# Patient Record
Sex: FEMALE | Race: WHITE | Employment: FULL TIME | URBAN - METROPOLITAN AREA
[De-identification: names, ages, dates, MRNs, and addresses within clinical notes are randomized per-mention and may not be internally consistent; named-entity substitution may affect disease eponyms.]

---

## 2017-02-17 ENCOUNTER — PATIENT MESSAGE (OUTPATIENT)
Dept: FAMILY MEDICINE CLINIC | Facility: CLINIC | Age: 52
End: 2017-02-17

## 2017-02-17 NOTE — TELEPHONE ENCOUNTER
From: Jaguar Rasmussen  To: Pura Hamilton MD  Sent: 2/17/2017 6:41 AM CST  Subject: Other    Hi Dr. Dominique Jones the new year is going well for you and your family.      Could you please fax a copy of all the shots I have had that you have on record

## 2017-03-16 ENCOUNTER — PATIENT MESSAGE (OUTPATIENT)
Dept: FAMILY MEDICINE CLINIC | Facility: CLINIC | Age: 52
End: 2017-03-16

## 2017-03-17 NOTE — TELEPHONE ENCOUNTER
From: Ashley Kelly  To: Shelly Quarles MD  Sent: 3/16/2017 5:55 PM CDT  Subject: Other    Hi dr Karthikeyan Avila! 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 I know you tested me for it.  Could you please send me the test result and I buzz

## 2017-06-06 DIAGNOSIS — E03.9 HYPOTHYROIDISM, UNSPECIFIED TYPE: Primary | ICD-10-CM

## 2017-06-07 ENCOUNTER — PATIENT MESSAGE (OUTPATIENT)
Dept: FAMILY MEDICINE CLINIC | Facility: CLINIC | Age: 52
End: 2017-06-07

## 2017-06-07 DIAGNOSIS — E03.9 HYPOTHYROIDISM, UNSPECIFIED TYPE: Primary | ICD-10-CM

## 2017-06-07 DIAGNOSIS — E55.9 VITAMIN D DEFICIENCY: ICD-10-CM

## 2017-06-07 DIAGNOSIS — D50.8 OTHER IRON DEFICIENCY ANEMIA: ICD-10-CM

## 2017-06-07 RX ORDER — LEVOTHYROXINE SODIUM 0.12 MG/1
125 TABLET ORAL
Qty: 90 TABLET | Refills: 0 | Status: SHIPPED | OUTPATIENT
Start: 2017-06-07 | End: 2017-09-07

## 2017-06-07 NOTE — TELEPHONE ENCOUNTER
From: Ozzy Sutherland  To: Rick Shah MD  Sent: 6/7/2017 7:26 AM CDT  Subject: Other    Hi Dr. Cheo Jacobs,    I think last October or so I had some blood drawn but did not do one of the tests because you had to fast and I did not know that.  I would li

## 2017-06-07 NOTE — TELEPHONE ENCOUNTER
No future appointments. LOV 10/16    LAST LAB 1/16    LAST RX  Levothyroxine Sodium 125 MCG Oral Tab (Discontinued) 30 tablet 1 10/12/2016       PROTOCOL  Thyroid Supplements Protocol Passed6/6 8:    Refilled x 3 months.

## 2017-06-27 ENCOUNTER — PATIENT MESSAGE (OUTPATIENT)
Dept: FAMILY MEDICINE CLINIC | Facility: CLINIC | Age: 52
End: 2017-06-27

## 2017-06-27 NOTE — TELEPHONE ENCOUNTER
From: Brandie Brian  To: Melvin Hein MD  Sent: 6/27/2017 12:35 PM CDT  Subject: Test Results Question    Hi Dr. Urbano Castorena,    I had some blood drawn last week and have not heard or seen results?

## 2017-09-06 ENCOUNTER — TELEPHONE (OUTPATIENT)
Dept: FAMILY MEDICINE CLINIC | Facility: CLINIC | Age: 52
End: 2017-09-06

## 2017-09-06 ENCOUNTER — PATIENT MESSAGE (OUTPATIENT)
Dept: FAMILY MEDICINE CLINIC | Facility: CLINIC | Age: 52
End: 2017-09-06

## 2017-09-06 DIAGNOSIS — E03.9 HYPOTHYROIDISM, UNSPECIFIED TYPE: ICD-10-CM

## 2017-09-06 RX ORDER — LEVOTHYROXINE SODIUM 112 MCG
112 TABLET ORAL
Refills: 0 | Status: CANCELLED | OUTPATIENT
Start: 2017-09-06

## 2017-09-06 NOTE — TELEPHONE ENCOUNTER
No future appointments.     LOV 10/16    LAST LAB 6/17    LAST RX  Levothyroxine Sodium 125 MCG Oral Tab (Discontinued) 90 tablet 1 11/30/2016 6/6/2017   Sig :  Take 1 tablet (125 mcg total) by mouth before breakfast.           PROTOCOL  Thyroid Supplements

## 2017-09-07 RX ORDER — LEVOTHYROXINE SODIUM 0.12 MG/1
125 TABLET ORAL
Qty: 30 TABLET | Refills: 0 | Status: SHIPPED | OUTPATIENT
Start: 2017-09-07 | End: 2017-09-26 | Stop reason: ALTCHOICE

## 2017-09-07 NOTE — TELEPHONE ENCOUNTER
Pt called and stated she wants to get her med filled for more than 1 month as her labs were just done 2 months ago. I offered a few times to schedule an appt and pt declined.     Said she's never had so much trouble getting medication filled and asked to

## 2017-09-07 NOTE — TELEPHONE ENCOUNTER
Spoke with pt, appt made    Future Appointments  Date Time Provider Ji Cordon   9/26/2017 1:00 PM Lester Del Rio MD [facility] [address]

## 2017-09-07 NOTE — TELEPHONE ENCOUNTER
Helena Postal, LPN   to Madyson Cabral           11:29 AM   Bola Hunter, you did repeat your thyroid labs in June but you haven't been into see Dr Lenny De Leon since 1/03/2016   You need to be seen yearly for this type of medication     Please give us a call or ch

## 2017-09-07 NOTE — TELEPHONE ENCOUNTER
From: Judene Runner, LPN  To: Darby Solano  Sent: 9/6/2017 4:04 PM CDT  Subject: information    Good afternoon Ann-Marie Varma    No future appointments. An appointment is due in order to get another refill. Please call [phone] for an appointment.      I ga

## 2017-09-26 ENCOUNTER — OFFICE VISIT (OUTPATIENT)
Dept: FAMILY MEDICINE CLINIC | Facility: CLINIC | Age: 52
End: 2017-09-26

## 2017-09-26 VITALS
RESPIRATION RATE: 16 BRPM | HEART RATE: 66 BPM | TEMPERATURE: 98 F | WEIGHT: 131.13 LBS | BODY MASS INDEX: 21.85 KG/M2 | DIASTOLIC BLOOD PRESSURE: 64 MMHG | SYSTOLIC BLOOD PRESSURE: 116 MMHG | HEIGHT: 65 IN

## 2017-09-26 DIAGNOSIS — N95.1 PERIMENOPAUSAL: ICD-10-CM

## 2017-09-26 DIAGNOSIS — H60.501 ACUTE OTITIS EXTERNA OF RIGHT EAR, UNSPECIFIED TYPE: ICD-10-CM

## 2017-09-26 DIAGNOSIS — Z00.00 ROUTINE GENERAL MEDICAL EXAMINATION AT A HEALTH CARE FACILITY: Primary | ICD-10-CM

## 2017-09-26 DIAGNOSIS — Z23 NEED FOR VACCINATION: ICD-10-CM

## 2017-09-26 DIAGNOSIS — Z12.11 SCREENING FOR COLON CANCER: ICD-10-CM

## 2017-09-26 DIAGNOSIS — E03.9 HYPOTHYROIDISM, UNSPECIFIED TYPE: ICD-10-CM

## 2017-09-26 PROCEDURE — 90471 IMMUNIZATION ADMIN: CPT | Performed by: FAMILY MEDICINE

## 2017-09-26 PROCEDURE — 90686 IIV4 VACC NO PRSV 0.5 ML IM: CPT | Performed by: FAMILY MEDICINE

## 2017-09-26 PROCEDURE — 99213 OFFICE O/P EST LOW 20 MIN: CPT | Performed by: FAMILY MEDICINE

## 2017-09-26 PROCEDURE — 99396 PREV VISIT EST AGE 40-64: CPT | Performed by: FAMILY MEDICINE

## 2017-09-26 RX ORDER — LEVOTHYROXINE SODIUM 125 UG/1
125 TABLET ORAL
Qty: 90 TABLET | Refills: 3 | Status: SHIPPED | OUTPATIENT
Start: 2017-09-26 | End: 2017-10-03 | Stop reason: DRUGHIGH

## 2017-09-26 RX ORDER — NEOMYCIN SULFATE, POLYMYXIN B SULFATE, HYDROCORTISONE 3.5; 10000; 1 MG/ML; [USP'U]/ML; MG/ML
SOLUTION/ DROPS AURICULAR (OTIC) 2 TIMES DAILY
Qty: 10 ML | Refills: 0 | Status: SHIPPED | OUTPATIENT
Start: 2017-09-26 | End: 2017-10-06

## 2017-09-26 NOTE — PATIENT INSTRUCTIONS
Prevention Guidelines, Women Ages 48 to 59  Screening tests and vaccines are an important part of managing your health. Health counseling is essential, too. Below are guidelines for these, for women ages 48 to 59.  Talk with your healthcare provider to ma Lung cancer Adults age 54 to [de-identified] who have smoked Yearly screening in smokers with 30 pack-year history of smoking or who quit within 15 years   Obesity All women in this age group At routine exams   Osteoporosis Women who are postmenopausal Ask your healthc PPSV23: 1 to 2 doses through age 59, or 1 dose at 72 or older (protects against 23 types of pneumococcal bacteria)   Tetanus/diphtheria/pertussis (Td/Tdap) booster All women in this age group Td every 10 years, or a one-time dose of Tdap instead of a Td michelle

## 2017-09-26 NOTE — PROGRESS NOTES
Romina Cline is a 47year old female here for Patient presents with: Well Adult:  Annual cpx  Medication Follow-Up: Thyroid medication refill    HPI:   Patient is seen for annual physical   Patient sees Rika Oconnell for pap and mammogram    Hypothyroidism: earache or change in hearing. No nasal congestion, allergies, sinus pain, nosebleed or sore throat. Heme/lymph: No unusual bleeding or bruising, no lymph node enlargement or tenderness.   Endocrine: No thyroid symptoms (hair, nails, weight, GI, concentrat normal limits, no joint tenderness or synovitis  EXTREMITIES: no cyanosis, clubbing or edema.  Peripheral pulses normal. + varicose veins bilateral  NEURO: Nonfocal exam. Oriented times three,cranial nerves are intact,motor and sensory are grossly intact, s

## 2017-09-30 LAB
ABSOLUTE BASOPHILS: 29 CELLS/UL (ref 0–200)
ABSOLUTE EOSINOPHILS: 51 CELLS/UL (ref 15–500)
ABSOLUTE LYMPHOCYTES: 1203 CELLS/UL (ref 850–3900)
ABSOLUTE MONOCYTES: 239 CELLS/UL (ref 200–950)
ABSOLUTE NEUTROPHILS: 4178 CELLS/UL (ref 1500–7800)
ALBUMIN/GLOBULIN RATIO: 1.7 (CALC) (ref 1–2.5)
ALBUMIN: 3.9 G/DL (ref 3.6–5.1)
ALKALINE PHOSPHATASE: 54 U/L (ref 33–130)
ALT: 11 U/L (ref 6–29)
AST: 16 U/L (ref 10–35)
BASOPHILS: 0.5 %
BILIRUBIN, TOTAL: 0.6 MG/DL (ref 0.2–1.2)
BUN: 9 MG/DL (ref 7–25)
CALCIUM: 8.9 MG/DL (ref 8.6–10.4)
CARBON DIOXIDE: 25 MMOL/L (ref 20–31)
CHLORIDE: 105 MMOL/L (ref 98–110)
CREATININE: 0.81 MG/DL (ref 0.5–1.05)
EGFR IF AFRICN AM: 97 ML/MIN/1.73M2
EGFR IF NONAFRICN AM: 84 ML/MIN/1.73M2
EOSINOPHILS: 0.9 %
GLOBULIN: 2.3 G/DL (CALC) (ref 1.9–3.7)
GLUCOSE: 79 MG/DL (ref 65–99)
HEMATOCRIT: 36.5 % (ref 35–45)
HEMOGLOBIN: 12.6 G/DL (ref 11.7–15.5)
LYMPHOCYTES: 21.1 %
MCH: 32.4 PG (ref 27–33)
MCHC: 34.5 G/DL (ref 32–36)
MCV: 93.8 FL (ref 80–100)
MONOCYTES: 4.2 %
MPV: 10 FL (ref 7.5–12.5)
NEUTROPHILS: 73.3 %
PLATELET COUNT: 229 THOUSAND/UL (ref 140–400)
POTASSIUM: 4.2 MMOL/L (ref 3.5–5.3)
PROTEIN, TOTAL: 6.2 G/DL (ref 6.1–8.1)
RDW: 11.8 % (ref 11–15)
RED BLOOD CELL COUNT: 3.89 MILLION/UL (ref 3.8–5.1)
SODIUM: 137 MMOL/L (ref 135–146)
TSH: 0.25 MIU/L
VITAMIN D, 25-OH, TOTAL: 24 NG/ML (ref 30–100)
WHITE BLOOD CELL COUNT: 5.7 THOUSAND/UL (ref 3.8–10.8)

## 2017-10-03 DIAGNOSIS — E03.9 HYPOTHYROIDISM, UNSPECIFIED TYPE: Primary | ICD-10-CM

## 2017-10-03 DIAGNOSIS — E03.9 HYPOTHYROIDISM, UNSPECIFIED TYPE: ICD-10-CM

## 2017-10-03 RX ORDER — LEVOTHYROXINE SODIUM 112 MCG
112 TABLET ORAL
Qty: 30 TABLET | Refills: 1 | Status: SHIPPED | OUTPATIENT
Start: 2017-10-03 | End: 2017-11-28 | Stop reason: DRUGHIGH

## 2017-10-03 NOTE — TELEPHONE ENCOUNTER
From: Sorin Phillips  Sent: 10/3/2017 1:15 PM CDT  Subject: Medication Renewal Request    Sorin Phillips would like a refill of the following medications:     SYNTHROID 112 MCG Oral Tab [OPBDJENISE Shook MD]   Patient Comment: Hi dr shook-could you pl

## 2017-10-04 RX ORDER — LEVOTHYROXINE SODIUM 112 MCG
112 TABLET ORAL
Qty: 30 TABLET | Refills: 1
Start: 2017-10-04 | End: 2017-11-03

## 2017-10-04 NOTE — TELEPHONE ENCOUNTER
Patient Result Comments     Viewed by Ashlee Abreu on 10/3/2017  1:03 PM   Written by Kyra Monge MD on 10/3/2017 12:34 PM   Thyroid function is too well controlled on the current dose of levothyroxine, will decrease dose to 112 mcg.  Vitamin D ins

## 2017-10-12 ENCOUNTER — TELEPHONE (OUTPATIENT)
Dept: FAMILY MEDICINE CLINIC | Facility: CLINIC | Age: 52
End: 2017-10-12

## 2017-10-12 NOTE — TELEPHONE ENCOUNTER
Daughter dropped off form when she was here today 10-12 for an Appt. Mom asked if Dr Gianni Trujillo could fill out form. Mom was here for Physical 9-26-17. Form on Dr RODRIGUEZ's desk. Will a form fee apply? Please call pt when form is ready.

## 2017-10-13 ENCOUNTER — MED REC SCAN ONLY (OUTPATIENT)
Dept: FAMILY MEDICINE CLINIC | Facility: CLINIC | Age: 52
End: 2017-10-13

## 2017-10-13 NOTE — TELEPHONE ENCOUNTER
Left message for pt, Form is from [employer] like pt may need PPD    Asked pt to call back with additional info to see if Dr Catie Cam can complete?

## 2017-10-13 NOTE — TELEPHONE ENCOUNTER
Pt called and asked to speak w/Yuly. Roro Marrero was standing at the [employer] machine and asked me to ask pt if she needs TB test.  Pt stated No.    Explained to pt Yuly/Dr RODRIGUEZ will complete form and it is ready this afternoon 10-13 to pick-up.     Pt stated

## 2018-01-22 ENCOUNTER — PATIENT MESSAGE (OUTPATIENT)
Dept: FAMILY MEDICINE CLINIC | Facility: CLINIC | Age: 53
End: 2018-01-22

## 2018-01-22 NOTE — TELEPHONE ENCOUNTER
From: Kira Andrews  To: Abdiel Rosales MD  Sent: 1/22/2018 7:49 AM CST  Subject: Prescription Question    Hi Dr. Love Castleman new year! I am almost out of synthroid refill. Know my thyroid level is changing again.  Do I need to get blood briana

## 2018-04-18 ENCOUNTER — PATIENT MESSAGE (OUTPATIENT)
Dept: FAMILY MEDICINE CLINIC | Facility: CLINIC | Age: 53
End: 2018-04-18

## 2018-04-18 NOTE — TELEPHONE ENCOUNTER
From: Brandie Brian  To: Melvin Hein MD  Sent: 4/18/2018 7:17 AM CDT  Subject: Non-Urgent Medical Question    Hi Dr. Urbano Castorena,     I have been running on treadmill for over 20 years. Just couple miles every other day.  For the past 3 weeks I have no

## 2018-04-19 ENCOUNTER — PATIENT MESSAGE (OUTPATIENT)
Dept: FAMILY MEDICINE CLINIC | Facility: CLINIC | Age: 53
End: 2018-04-19

## 2018-04-19 NOTE — TELEPHONE ENCOUNTER
From: Harry Spann  To: Lemuel Lema MD  Sent: 4/19/2018 6:43 AM CDT  Subject: Other    Hi Matagami,     I understand what you are saying but I will almost guarantee Dr. Fox Villeda will request blood work. (THYROID, IRON AND CALCIUM).  And whatever else

## 2018-04-19 NOTE — TELEPHONE ENCOUNTER
Her answer is from this [employer]. Where I advised her to come in. I have been running on treadmill for over 20 years. Pra[employer] Tolentino couple miles every other day. For the past 3 weeks I have not been able to stay running longer than 2 minutes.  It's lik

## 2018-04-24 ENCOUNTER — OFFICE VISIT (OUTPATIENT)
Dept: FAMILY MEDICINE CLINIC | Facility: CLINIC | Age: 53
End: 2018-04-24

## 2018-04-24 VITALS
HEIGHT: 65 IN | HEART RATE: 62 BPM | DIASTOLIC BLOOD PRESSURE: 62 MMHG | SYSTOLIC BLOOD PRESSURE: 108 MMHG | BODY MASS INDEX: 21.85 KG/M2 | WEIGHT: 131.13 LBS | TEMPERATURE: 99 F | OXYGEN SATURATION: 100 % | RESPIRATION RATE: 16 BRPM

## 2018-04-24 DIAGNOSIS — N92.4 PERIMENOPAUSAL MENORRHAGIA: Primary | ICD-10-CM

## 2018-04-24 DIAGNOSIS — R53.83 OTHER FATIGUE: ICD-10-CM

## 2018-04-24 DIAGNOSIS — E03.9 HYPOTHYROIDISM, UNSPECIFIED TYPE: ICD-10-CM

## 2018-04-24 DIAGNOSIS — F41.9 ANXIETY: ICD-10-CM

## 2018-04-24 PROCEDURE — 99213 OFFICE O/P EST LOW 20 MIN: CPT | Performed by: FAMILY MEDICINE

## 2018-04-24 NOTE — PROGRESS NOTES
Albert Miranda is a 47year old female. Patient presents with:  Menstrual Problem: More frequent cycles here to yves.     HPI:   Patient complaining that since February has had a period every 2 weeks, Feb 13th, March 5th, March 20th, April 10th, periods

## 2018-04-24 NOTE — PATIENT INSTRUCTIONS
Heavy Menstrual Bleeding    Heavy menstrual bleeding means that your periods are heavier or longer than usual. You may soak through a pad or tampon every 1 to 3 hours on the heaviest days of your period. You may also pass large, dark clots. And your azalia · Heavier bleeding (soaking 1 pad or tampon every hour for 3 hours)  · Heavy bleeding that lasts longer than 1 week  · Fever of 100.4ºF (38ºC) or higher, or as directed by your provider  · Pain or cramping that gets worse instead of better  · Signs of anem

## 2018-06-11 ENCOUNTER — PATIENT MESSAGE (OUTPATIENT)
Dept: FAMILY MEDICINE CLINIC | Facility: CLINIC | Age: 53
End: 2018-06-11

## 2018-06-11 DIAGNOSIS — D50.0 IRON DEFICIENCY ANEMIA DUE TO CHRONIC BLOOD LOSS: Primary | ICD-10-CM

## 2018-06-11 NOTE — TELEPHONE ENCOUNTER
From: Tate Berry  To: Edel Day MD  Sent: 6/11/2018 7:47 AM CDT  Subject: Prescription Question    Hi Dr. Royer Bella,    I received your message about my synthroid prescription. Thank you.  I wanted to know if I should still take the Iron 65 mg p

## 2018-07-16 ENCOUNTER — PATIENT MESSAGE (OUTPATIENT)
Dept: FAMILY MEDICINE CLINIC | Facility: CLINIC | Age: 53
End: 2018-07-16

## 2018-07-16 DIAGNOSIS — E03.9 HYPOTHYROIDISM, UNSPECIFIED TYPE: Primary | ICD-10-CM

## 2018-07-16 NOTE — TELEPHONE ENCOUNTER
From: Romina Cline  To: Noemi Knowles MD  Sent: 7/16/2018 11:16 AM CDT  Subject: Other    Hi dr. Yamileth Sofia,    I wanted to know if u could send in a request to check my thyroid again since I am having to get blood drawn for another reason.  For last c

## 2018-07-20 LAB
% SATURATION: 28 % (CALC) (ref 11–50)
ABSOLUTE BASOPHILS: 70 CELLS/UL (ref 0–200)
ABSOLUTE EOSINOPHILS: 170 CELLS/UL (ref 15–500)
ABSOLUTE LYMPHOCYTES: 1135 CELLS/UL (ref 850–3900)
ABSOLUTE MONOCYTES: 250 CELLS/UL (ref 200–950)
ABSOLUTE NEUTROPHILS: 3375 CELLS/UL (ref 1500–7800)
BASOPHILS: 1.4 %
EOSINOPHILS: 3.4 %
FERRITIN: 16 NG/ML (ref 10–232)
HEMATOCRIT: 40.2 % (ref 35–45)
HEMOGLOBIN: 12.7 G/DL (ref 11.7–15.5)
IRON BINDING CAPACITY: 283 MCG/DL (CALC) (ref 250–450)
IRON, TOTAL: 80 MCG/DL (ref 45–160)
LYMPHOCYTES: 22.7 %
MCH: 28.9 PG (ref 27–33)
MCHC: 31.6 G/DL (ref 32–36)
MCV: 91.6 FL (ref 80–100)
MONOCYTES: 5 %
MPV: 10.3 FL (ref 7.5–12.5)
NEUTROPHILS: 67.5 %
PLATELET COUNT: 236 THOUSAND/UL (ref 140–400)
RDW: 15.3 % (ref 11–15)
RED BLOOD CELL COUNT: 4.39 MILLION/UL (ref 3.8–5.1)
T4, FREE: 1.5 NG/DL (ref 0.8–1.8)
TSH: 0.22 MIU/L
WHITE BLOOD CELL COUNT: 5 THOUSAND/UL (ref 3.8–10.8)

## 2018-07-23 DIAGNOSIS — E03.9 HYPOTHYROIDISM, UNSPECIFIED TYPE: Primary | ICD-10-CM

## 2018-07-23 RX ORDER — LEVOTHYROXINE SODIUM 100 MCG
100 TABLET ORAL
Qty: 60 TABLET | Refills: 0 | Status: SHIPPED | OUTPATIENT
Start: 2018-07-23 | End: 2018-10-22 | Stop reason: DRUGHIGH

## 2018-07-24 ENCOUNTER — PATIENT MESSAGE (OUTPATIENT)
Dept: FAMILY MEDICINE CLINIC | Facility: CLINIC | Age: 53
End: 2018-07-24

## 2018-07-30 ENCOUNTER — PATIENT MESSAGE (OUTPATIENT)
Dept: FAMILY MEDICINE CLINIC | Facility: CLINIC | Age: 53
End: 2018-07-30

## 2018-07-31 NOTE — TELEPHONE ENCOUNTER
From: Brandie Brian  To: Melvin Hein MD  Sent: 7/30/2018 1:59 PM CDT  Subject: Referral Request    Hi dr Romana Rinne,    Do you know of a good Ear Nose Throat specialist you would recommend. No t for me. It is for my son.      Thank you     Jamie Nolasco

## 2018-09-12 DIAGNOSIS — E03.9 HYPOTHYROIDISM, UNSPECIFIED TYPE: ICD-10-CM

## 2018-09-17 RX ORDER — LEVOTHYROXINE SODIUM 100 MCG
TABLET ORAL
Qty: 60 TABLET | Refills: 0 | OUTPATIENT
Start: 2018-09-17

## 2018-09-17 NOTE — TELEPHONE ENCOUNTER
LOV 4/18    LAST LAB Viewed by Sorin Phillips on 7/23/2018  4:01 PM   Written by Sharlene Mckeon MD on 7/23/2018  3:26 PM   Anemia has resolved, your iron levels are now normal and.  Thyroid function in the hyperthyroid range.  Dose of medication [facility]

## 2018-09-18 LAB
T4, FREE: 1.1 NG/DL (ref 0.8–1.8)
TSH: 0.1 MIU/L

## 2018-10-22 ENCOUNTER — OFFICE VISIT (OUTPATIENT)
Dept: FAMILY MEDICINE CLINIC | Facility: CLINIC | Age: 53
End: 2018-10-22
Payer: COMMERCIAL

## 2018-10-22 VITALS
TEMPERATURE: 99 F | RESPIRATION RATE: 16 BRPM | DIASTOLIC BLOOD PRESSURE: 62 MMHG | SYSTOLIC BLOOD PRESSURE: 112 MMHG | BODY MASS INDEX: 21.74 KG/M2 | WEIGHT: 130.5 LBS | HEIGHT: 65 IN | OXYGEN SATURATION: 98 % | HEART RATE: 56 BPM

## 2018-10-22 DIAGNOSIS — Z00.00 ROUTINE GENERAL MEDICAL EXAMINATION AT A HEALTH CARE FACILITY: Primary | ICD-10-CM

## 2018-10-22 DIAGNOSIS — Z23 NEED FOR VACCINATION: ICD-10-CM

## 2018-10-22 DIAGNOSIS — Z12.83 SCREENING FOR SKIN CANCER: ICD-10-CM

## 2018-10-22 DIAGNOSIS — E55.9 VITAMIN D DEFICIENCY: ICD-10-CM

## 2018-10-22 PROCEDURE — 90686 IIV4 VACC NO PRSV 0.5 ML IM: CPT | Performed by: FAMILY MEDICINE

## 2018-10-22 PROCEDURE — 90471 IMMUNIZATION ADMIN: CPT | Performed by: FAMILY MEDICINE

## 2018-10-22 PROCEDURE — 99396 PREV VISIT EST AGE 40-64: CPT | Performed by: FAMILY MEDICINE

## 2018-10-22 NOTE — PROGRESS NOTES
Bethanie Preston is a 47year old female here for Patient presents with: Well Adult: Annual cpx and flu vaccine only  Complete Form: Form for employer.     HPI:   Patient is seen for annual physical   Patient sees Sung Hairston for pap and mammogram, has an appo polyuria, polydipsia, polyphagia). Respiratory: No cough, shortness of breath, dyspnea on exertion, wheezing, hemoptysis. Cardiovascular: No heart palpitations, irregular heartbeat, faintness or syncope, no chest pressure or chest pain on exertion.  No ed Cam Casillas was seen today for well adult and complete form. Diagnoses and all orders for this visit:    Routine general medical examination at a health care facility  -     LIPID PANEL; Future  -     COMP METABOLIC PANEL (14);  Future  -     CBC WITH DIFFER

## 2018-11-01 ENCOUNTER — MED REC SCAN ONLY (OUTPATIENT)
Dept: FAMILY MEDICINE CLINIC | Facility: CLINIC | Age: 53
End: 2018-11-01

## 2018-11-07 ENCOUNTER — TELEPHONE (OUTPATIENT)
Dept: FAMILY MEDICINE CLINIC | Facility: CLINIC | Age: 53
End: 2018-11-07

## 2018-11-07 NOTE — TELEPHONE ENCOUNTER
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 Forgot to ask Dr Carrie Milian when she was here for Physical.    Proof of MMR, or to prove she is immune to MMR. Transferred to Triage . Pls call patient.

## 2018-11-08 ENCOUNTER — OFFICE VISIT (OUTPATIENT)
Dept: FAMILY MEDICINE CLINIC | Facility: CLINIC | Age: 53
End: 2018-11-08
Payer: COMMERCIAL

## 2018-11-08 DIAGNOSIS — Z11.1 PPD SCREENING TEST: Primary | ICD-10-CM

## 2018-11-08 PROCEDURE — 86580 TB INTRADERMAL TEST: CPT | Performed by: NURSE PRACTITIONER

## 2018-11-08 NOTE — PATIENT INSTRUCTIONS
You will need to return to clinic in 48-72 hours to have results of TB test read. Please return to clinic on 11/10/18 between 9:20am to 4:20pm to have your tb read.

## 2018-11-08 NOTE — PROGRESS NOTES
Lum Hoof 47year old female       [place] [address]    · Live vaccines in the past month? no  · Any steroid medication in the past month? no  · History of BCG vaccine? no  · If female, are you currently pregnant?   no    · Are

## 2018-11-10 ENCOUNTER — OFFICE VISIT (OUTPATIENT)
Dept: FAMILY MEDICINE CLINIC | Facility: CLINIC | Age: 53
End: 2018-11-10
Payer: COMMERCIAL

## 2018-11-10 DIAGNOSIS — Z11.1 TUBERCULIN SKIN TEST READING ENCOUNTER: Primary | ICD-10-CM

## 2018-11-12 ENCOUNTER — OFFICE VISIT (OUTPATIENT)
Dept: OBGYN CLINIC | Facility: CLINIC | Age: 53
End: 2018-11-12
Payer: COMMERCIAL

## 2018-11-12 VITALS
BODY MASS INDEX: 22.16 KG/M2 | HEART RATE: 72 BPM | DIASTOLIC BLOOD PRESSURE: 64 MMHG | SYSTOLIC BLOOD PRESSURE: 124 MMHG | HEIGHT: 65 IN | WEIGHT: 133 LBS

## 2018-11-12 DIAGNOSIS — Z12.39 SCREENING FOR MALIGNANT NEOPLASM OF BREAST: ICD-10-CM

## 2018-11-12 DIAGNOSIS — Z01.419 WELL WOMAN EXAM WITH ROUTINE GYNECOLOGICAL EXAM: Primary | ICD-10-CM

## 2018-11-12 DIAGNOSIS — Z12.4 PAPANICOLAOU SMEAR FOR CERVICAL CANCER SCREENING: ICD-10-CM

## 2018-11-12 PROCEDURE — 88175 CYTOPATH C/V AUTO FLUID REDO: CPT | Performed by: OBSTETRICS & GYNECOLOGY

## 2018-11-12 PROCEDURE — 87624 HPV HI-RISK TYP POOLED RSLT: CPT | Performed by: OBSTETRICS & GYNECOLOGY

## 2018-11-12 PROCEDURE — 99396 PREV VISIT EST AGE 40-64: CPT | Performed by: OBSTETRICS & GYNECOLOGY

## 2018-11-12 NOTE — PROGRESS NOTES
Daina Fowler is a 47year old female [id number] Patient's last menstrual period was 08/07/2018 (approximate). Patient presents with:  Wellness Visit  . Her last period was 3 months ago.   She saw her primary and was diagnosed as being anemic she had heav Not on file      Transportation needs - non-medical: Not on file    Occupational History      Not on file    Tobacco Use      Smoking status: Never Smoker      Smokeless tobacco: Never Used    Substance and Sexual Activity      Alcohol use: No      Drug us well nourished  Head/Face: normocephalic  Neck/Thyroid: thyroid symmetric, no thyromegaly, no nodules, no adenopathy  Breast: normal without palpable masses, tenderness, asymmetry, nipple discharge, nipple retraction or skin changes  Abdomen:  soft, nonten

## 2019-01-08 ENCOUNTER — PATIENT MESSAGE (OUTPATIENT)
Dept: FAMILY MEDICINE CLINIC | Facility: CLINIC | Age: 54
End: 2019-01-08

## 2019-06-12 ENCOUNTER — PATIENT MESSAGE (OUTPATIENT)
Dept: FAMILY MEDICINE CLINIC | Facility: CLINIC | Age: 54
End: 2019-06-12

## 2019-06-12 DIAGNOSIS — E55.9 VITAMIN D DEFICIENCY: Primary | ICD-10-CM

## 2019-06-12 NOTE — TELEPHONE ENCOUNTER
From: Sarah Fuentes  To: Lyn Raphael MD  Sent: 6/12/2019 9:43 AM CDT  Subject: Non-Urgent Medical Question    Hi Dr. Dottie Nolasco your summer is going well. I was just wanting to check in with getting my blood drawn for my thyroid.  Also usuall

## 2019-06-26 ENCOUNTER — PATIENT MESSAGE (OUTPATIENT)
Dept: FAMILY MEDICINE CLINIC | Facility: CLINIC | Age: 54
End: 2019-06-26

## 2019-06-26 DIAGNOSIS — E03.9 HYPOTHYROIDISM, UNSPECIFIED TYPE: Primary | ICD-10-CM

## 2019-06-26 NOTE — TELEPHONE ENCOUNTER
From: Roxanne Crawford  To: Elisabeth Montelongo MD  Sent: 6/26/2019 11:09 AM CDT  Subject: Non-Urgent Medical Question    Hi Dr. Ev Pelaez,    Thank you for getting back with me. Would it be ok to get my thyroid checked again?  I think the last time we talked w

## 2019-06-28 ENCOUNTER — TELEPHONE (OUTPATIENT)
Dept: FAMILY MEDICINE CLINIC | Facility: CLINIC | Age: 54
End: 2019-06-28

## 2019-06-28 DIAGNOSIS — E03.9 HYPOTHYROIDISM, UNSPECIFIED TYPE: Primary | ICD-10-CM

## 2019-06-28 DIAGNOSIS — E55.9 VITAMIN D DEFICIENCY: ICD-10-CM

## 2019-06-28 NOTE — TELEPHONE ENCOUNTER
Pt left a voicemail stating she went to [facility] this morning to get her blood work done but did not because she did not think the orders were accurate.  (attempted to transfer to Triage  but had a problem). Please advise.

## 2019-06-28 NOTE — TELEPHONE ENCOUNTER
Returned call to patient. Joanie Mera is listed at the preferred lab in her chart and that is where the orders for thyroid and Vitamin D testing were sent.   States always has labs done at [address] reordered for [facility] per patient's request.

## 2019-10-22 ENCOUNTER — HOSPITAL ENCOUNTER (EMERGENCY)
Facility: HOSPITAL | Age: 54
Discharge: HOME | End: 2019-10-23
Attending: EMERGENCY MEDICINE
Payer: COMMERCIAL

## 2019-10-22 ENCOUNTER — PATIENT MESSAGE (OUTPATIENT)
Dept: FAMILY MEDICINE CLINIC | Facility: CLINIC | Age: 54
End: 2019-10-22

## 2019-10-22 ENCOUNTER — MOBILE ENCOUNTER (OUTPATIENT)
Dept: INTERNAL MEDICINE CLINIC | Facility: CLINIC | Age: 54
End: 2019-10-22

## 2019-10-22 ENCOUNTER — TELEPHONE (OUTPATIENT)
Dept: FAMILY MEDICINE CLINIC | Facility: CLINIC | Age: 54
End: 2019-10-22

## 2019-10-22 ENCOUNTER — OFFICE VISIT (OUTPATIENT)
Dept: FAMILY MEDICINE CLINIC | Facility: CLINIC | Age: 54
End: 2019-10-22
Payer: COMMERCIAL

## 2019-10-22 VITALS
HEIGHT: 65 IN | WEIGHT: 132 LBS | TEMPERATURE: 98 F | SYSTOLIC BLOOD PRESSURE: 118 MMHG | OXYGEN SATURATION: 99 % | DIASTOLIC BLOOD PRESSURE: 82 MMHG | HEART RATE: 76 BPM | BODY MASS INDEX: 21.99 KG/M2 | RESPIRATION RATE: 18 BRPM

## 2019-10-22 DIAGNOSIS — H60.501 ACUTE OTITIS EXTERNA OF RIGHT EAR, UNSPECIFIED TYPE: ICD-10-CM

## 2019-10-22 DIAGNOSIS — J01.00 ACUTE NON-RECURRENT MAXILLARY SINUSITIS: Primary | ICD-10-CM

## 2019-10-22 DIAGNOSIS — H60.501 ACUTE OTITIS EXTERNA OF RIGHT EAR, UNSPECIFIED TYPE: Primary | ICD-10-CM

## 2019-10-22 DIAGNOSIS — H69.83 EUSTACHIAN TUBE DYSFUNCTION, BILATERAL: ICD-10-CM

## 2019-10-22 DIAGNOSIS — R51.9 FACIAL PAIN: Primary | ICD-10-CM

## 2019-10-22 PROBLEM — Z01.419 WELL WOMAN EXAM WITH ROUTINE GYNECOLOGICAL EXAM: Status: RESOLVED | Noted: 2018-11-12 | Resolved: 2019-10-22

## 2019-10-22 PROCEDURE — 99284 EMERGENCY DEPT VISIT MOD MDM: CPT

## 2019-10-22 PROCEDURE — 99213 OFFICE O/P EST LOW 20 MIN: CPT | Performed by: FAMILY MEDICINE

## 2019-10-22 RX ORDER — CIPROFLOXACIN AND DEXAMETHASONE 3; 1 MG/ML; MG/ML
4 SUSPENSION/ DROPS AURICULAR (OTIC) 2 TIMES DAILY
Qty: 7.5 ML | Refills: 0 | Status: SHIPPED | OUTPATIENT
Start: 2019-10-22 | End: 2019-10-22 | Stop reason: ALTCHOICE

## 2019-10-22 RX ORDER — FLUTICASONE PROPIONATE 50 MCG
2 SPRAY, SUSPENSION (ML) NASAL DAILY
Qty: 1 BOTTLE | Refills: 0 | Status: SHIPPED | OUTPATIENT
Start: 2019-10-22 | End: 2019-11-01

## 2019-10-22 RX ORDER — AMOXICILLIN AND CLAVULANATE POTASSIUM 875; 125 MG/1; MG/1
1 TABLET, FILM COATED ORAL 2 TIMES DAILY
Qty: 20 TABLET | Refills: 0 | Status: SHIPPED | OUTPATIENT
Start: 2019-10-22 | End: 2019-11-01

## 2019-10-22 RX ORDER — PREDNISONE 20 MG/1
TABLET ORAL
Qty: 10 TABLET | Refills: 0 | Status: SHIPPED | OUTPATIENT
Start: 2019-10-22 | End: 2019-11-14 | Stop reason: ALTCHOICE

## 2019-10-22 NOTE — TELEPHONE ENCOUNTER
From: Ozzy Sutherland  To: Daryle Powers, MD  Sent: 10/22/2019 1:37 PM CDT  Subject: Other    Hi Dr. Cheo Jacobs,    Thank you for seeing me this morning. Really appreciated it. I have a couple question.     I took the amoxicillin and predos today at noon an

## 2019-10-22 NOTE — PROGRESS NOTES
Nikki Burr is a 49year old female.   Patient presents with:  Sinus Problem: pain in left side of face    HPI:   Patient complaining of nasal congestion, sinus pressure and facial swelling, states symptoms started a week ago and has been taking sudafed congested, left external ear canal is normal and tm is congested, no oropharyngeal erythema, nasal mucosa and turbinates are red and swollen, + tenderness to palpation over the maxillary sinus  NECK: supple,no adenopathy  LUNGS: clear to auscultation  CARD

## 2019-10-22 NOTE — TELEPHONE ENCOUNTER
From: Harry Spann  To: Maycol Godoy MD  Sent: 10/22/2019 3:27 PM CDT  Subject: Other    I have to leave cold water in my mouth or I can’t take the pain! How much Tylenol? And how long usually does it take pain to go away? I cannot eat either.  When

## 2019-10-22 NOTE — TELEPHONE ENCOUNTER
Patient says her pain is worse it is now in her teeth on the left side. Please advise.  Thank You    Also ear drops are $300 can she have something else?      ciprofloxacin-dexamethasone (CIPRODEX) 0.3-0.1 % Otic Suspension 7.5 mL 0 10/22/2019 10/29/2019

## 2019-10-23 ENCOUNTER — TELEPHONE (OUTPATIENT)
Dept: INTERNAL MEDICINE CLINIC | Facility: CLINIC | Age: 54
End: 2019-10-23

## 2019-10-23 ENCOUNTER — APPOINTMENT (OUTPATIENT)
Dept: CT IMAGING | Facility: HOSPITAL | Age: 54
End: 2019-10-23
Attending: EMERGENCY MEDICINE
Payer: COMMERCIAL

## 2019-10-23 VITALS
RESPIRATION RATE: 18 BRPM | DIASTOLIC BLOOD PRESSURE: 71 MMHG | SYSTOLIC BLOOD PRESSURE: 134 MMHG | TEMPERATURE: 97 F | HEART RATE: 56 BPM

## 2019-10-23 PROCEDURE — 70450 CT HEAD/BRAIN W/O DYE: CPT | Performed by: EMERGENCY MEDICINE

## 2019-10-23 RX ORDER — HYDROCODONE BITARTRATE AND ACETAMINOPHEN 5; 325 MG/1; MG/1
1 TABLET ORAL EVERY 6 HOURS PRN
Qty: 10 TABLET | Refills: 0 | Status: SHIPPED | OUTPATIENT
Start: 2019-10-23 | End: 2019-11-14 | Stop reason: ALTCHOICE

## 2019-10-23 RX ORDER — HYDROCODONE BITARTRATE AND ACETAMINOPHEN 5; 325 MG/1; MG/1
1 TABLET ORAL ONCE
Status: COMPLETED | OUTPATIENT
Start: 2019-10-23 | End: 2019-10-23

## 2019-10-23 NOTE — TELEPHONE ENCOUNTER
From: Brandie Brian  To: Varsha Ortega MD  Sent: 10/22/2019 4:26 PM CDT  Subject: Other    Thank you for getting back with me . I am crying like a baby! So frustrating.  I am trying to put my head over steaming pan, And saline spray, and tried warm in

## 2019-10-23 NOTE — TELEPHONE ENCOUNTER
Late entry. Patient called at 11pm 10/22/19 with c/o continued pain in her tooth despite tylenol and cold water. Patient described pain as severe and that she couldn't take it. Requested narcotic pain medication.  I discussed with patient I am unable to pre

## 2019-10-23 NOTE — ED PROVIDER NOTES
Patient Seen in: [facility] Emergency Department      History   Patient presents with:  Headache (neurologic)    Stated Complaint: diagnosed with sinus infection, on prednisosne and antibiotic, wants pain meds    HPI    This is a 60-year-old female All other systems reviewed and negative except as noted above.     Physical Exam     ED Triage Vitals [10/22/19 7413]   BP (!) 127/100   Pulse 92   Resp 18   Temp 97.1 °F (36.2 °C)   Temp src Oral   SpO2    O2 Device        Current:BP (!) 127/100   Puls 74099  [phone]    In 2 days          Medications Prescribed:  Current Discharge Medication List    START taking these medications    HYDROcodone-acetaminophen 5-325 MG Oral Tab  Take 1 tablet by mouth every 6 (six) hours as needed for Pain.   Qty: 10 t

## 2019-10-23 NOTE — TELEPHONE ENCOUNTER
Dr. Steven Conde  Merline Nap    Patient was seen in ED last night. MDM      CT scan of the brain was obtained and demonstrated no sinus disease. She was given a Norco for pain.     Feel this is most likely a dental issue. The pain is alleviated with cold water.

## 2019-10-24 ENCOUNTER — IMMUNIZATION (OUTPATIENT)
Dept: FAMILY MEDICINE CLINIC | Facility: CLINIC | Age: 54
End: 2019-10-24
Payer: COMMERCIAL

## 2019-10-24 DIAGNOSIS — Z23 NEED FOR VACCINATION: ICD-10-CM

## 2019-10-24 PROCEDURE — 90471 IMMUNIZATION ADMIN: CPT | Performed by: NURSE PRACTITIONER

## 2019-10-24 PROCEDURE — 90686 IIV4 VACC NO PRSV 0.5 ML IM: CPT | Performed by: NURSE PRACTITIONER

## 2019-10-24 NOTE — TELEPHONE ENCOUNTER
Spoke to patient she says Dr Jocelyn Cross called her and is aware that she had to get a root canal as thi was the cause of her pain. She is feeling so much better now.

## 2019-11-04 DIAGNOSIS — E03.9 HYPOTHYROIDISM, UNSPECIFIED TYPE: ICD-10-CM

## 2019-11-04 RX ORDER — LEVOTHYROXINE SODIUM 88 MCG
TABLET ORAL
Qty: 15 TABLET | Refills: 0 | Status: SHIPPED | OUTPATIENT
Start: 2019-11-04 | End: 2019-11-15

## 2019-11-04 NOTE — TELEPHONE ENCOUNTER
LOV 10-22-19    LAST LAB 6-28-19    LAST RX  10-30-19 90*3    Next OV   Future Appointments   Date Time Provider Ji Valeroi   11/14/2019  9:20 AM Ashley Hale MD [facility] [address]   11/18/2019 12:45 PM Ba Dowling MD [facility] [address]

## 2019-11-04 NOTE — TELEPHONE ENCOUNTER
Refilled until appointment, will give further refills at appointment. Patient needs to recheck labs.

## 2019-11-14 ENCOUNTER — LAB ENCOUNTER (OUTPATIENT)
Dept: LAB | Age: 54
End: 2019-11-14
Attending: FAMILY MEDICINE
Payer: COMMERCIAL

## 2019-11-14 ENCOUNTER — OFFICE VISIT (OUTPATIENT)
Dept: FAMILY MEDICINE CLINIC | Facility: CLINIC | Age: 54
End: 2019-11-14
Payer: COMMERCIAL

## 2019-11-14 VITALS
BODY MASS INDEX: 22.16 KG/M2 | DIASTOLIC BLOOD PRESSURE: 78 MMHG | HEART RATE: 73 BPM | TEMPERATURE: 98 F | OXYGEN SATURATION: 98 % | WEIGHT: 133 LBS | HEIGHT: 65 IN | RESPIRATION RATE: 18 BRPM | SYSTOLIC BLOOD PRESSURE: 110 MMHG

## 2019-11-14 DIAGNOSIS — N95.1 MENOPAUSAL FLUSHING: ICD-10-CM

## 2019-11-14 DIAGNOSIS — Z00.00 ROUTINE GENERAL MEDICAL EXAMINATION AT A HEALTH CARE FACILITY: Primary | ICD-10-CM

## 2019-11-14 DIAGNOSIS — E03.9 HYPOTHYROIDISM, UNSPECIFIED TYPE: ICD-10-CM

## 2019-11-14 DIAGNOSIS — Z00.00 ROUTINE GENERAL MEDICAL EXAMINATION AT A HEALTH CARE FACILITY: ICD-10-CM

## 2019-11-14 PROCEDURE — 99396 PREV VISIT EST AGE 40-64: CPT | Performed by: FAMILY MEDICINE

## 2019-11-14 PROCEDURE — 84439 ASSAY OF FREE THYROXINE: CPT | Performed by: FAMILY MEDICINE

## 2019-11-14 PROCEDURE — 80061 LIPID PANEL: CPT | Performed by: FAMILY MEDICINE

## 2019-11-14 PROCEDURE — 80050 GENERAL HEALTH PANEL: CPT | Performed by: FAMILY MEDICINE

## 2019-11-14 PROCEDURE — 99213 OFFICE O/P EST LOW 20 MIN: CPT | Performed by: FAMILY MEDICINE

## 2019-11-14 RX ORDER — LEVOTHYROXINE SODIUM 88 UG/1
TABLET ORAL
Qty: 15 TABLET | Refills: 0 | Status: CANCELLED | OUTPATIENT
Start: 2019-11-14

## 2019-11-14 NOTE — PATIENT INSTRUCTIONS
Prevention Guidelines, Women Ages 48 to 59  Screening tests and vaccines are an important part of managing your health. A screening test is done to find possible disorders or diseases in people who don't have any symptoms.  The goal is to find a disease e Colorectal cancer All women at average risk in this age group Multiple tests are available and are used at different times.  Possible tests include:  · Flexible sigmoidoscopy every 5 years, or  · Colonoscopy every 10 years, or  · CT colonography (virtual co Chickenpox (varicella) All women in this age group who have no record of this infection or vaccine 2 doses; the second dose should be given at least 4 weeks after the first dose   Hepatitis A Women at increased risk for infection – talk with your healthcar Diet and exercise Women who are overweight or obese When diagnosed, and then at routine exams   Sexually transmitted infection prevention Women at increased risk for infection – talk with your healthcare provider At routine exams   Use of daily aspirin Wom It is important to remember that you could become pregnant until 12 months have passed since your last menstrual period.  Ask your healthcare provider about birth control choices.   Controlling symptoms  Your healthcare provider may suggest pills or an intr

## 2019-11-14 NOTE — PROGRESS NOTES
Alex Khan is a 49year old female here for Patient presents with:  Physical    HPI:   Patient is seen for annual physical   Patient sees Carina Gill for pap and mammogram, not due for her pap, mammogram done in August at [facility] was normal.  Colonoscopy is symptoms (hair, nails, weight, GI, concentration, depression). No history or symptoms of diabetes, no polyuria, polydipsia, polyphagia). Respiratory: No cough, shortness of breath, dyspnea on exertion, wheezing, hemoptysis.   Cardiovascular: No heart palpi sensory are grossly intact, speech normal, DTR's equal bilaterally. PSYCH: well groomed, appropriate mood and affect.     ASSESSMENT AND PLAN:   Phani Leventhal was seen today for physical.    Diagnoses and all orders for this visit:    Routine general medical exami

## 2019-11-18 ENCOUNTER — OFFICE VISIT (OUTPATIENT)
Dept: OBGYN CLINIC | Facility: CLINIC | Age: 54
End: 2019-11-18
Payer: COMMERCIAL

## 2019-11-18 VITALS
SYSTOLIC BLOOD PRESSURE: 110 MMHG | HEART RATE: 56 BPM | WEIGHT: 132 LBS | DIASTOLIC BLOOD PRESSURE: 62 MMHG | BODY MASS INDEX: 21.99 KG/M2 | HEIGHT: 65 IN

## 2019-11-18 DIAGNOSIS — Z01.419 WELL WOMAN EXAM WITH ROUTINE GYNECOLOGICAL EXAM: Primary | ICD-10-CM

## 2019-11-18 DIAGNOSIS — Z12.31 ENCOUNTER FOR SCREENING MAMMOGRAM FOR MALIGNANT NEOPLASM OF BREAST: ICD-10-CM

## 2019-11-18 PROCEDURE — 99396 PREV VISIT EST AGE 40-64: CPT | Performed by: OBSTETRICS & GYNECOLOGY

## 2019-11-18 RX ORDER — BIOTIN 1 MG
1 TABLET ORAL DAILY
COMMUNITY
End: 2020-02-28

## 2019-11-18 NOTE — PROGRESS NOTES
Daina Fowler is a 49year old female  No LMP recorded. Patient is premenopausal. Patient presents with:  Wellness Visit  . She has not had any periods since April of this year is having about 5 hot flashes a day.   Hormones versus otc v antidep file        Inability: Not on file      Transportation needs:        Medical: Not on file        Non-medical: Not on file    Tobacco Use      Smoking status: Never Smoker      Smokeless tobacco: Never Used    Substance and Sexual Activity      Alcohol use: Colon Polyps Mother    • No Known Problems Daughter    • No Known Problems Daughter        MEDICATIONS:    Current Outpatient Medications:   •  Cholecalciferol (VITAMIN D3) 25 MCG (1000 UT) Oral Cap, Take 1 tablet by mouth daily. , Disp: , Rfl:   •  SYNTHRO orders for this visit:    Well woman exam with routine gynecological exam    Encounter for screening mammogram for malignant neoplasm of breast  -     [facility] DAVID 2D+3D SCREENING BILAT (CPT=77067/61928);  Future

## 2019-12-01 ENCOUNTER — PATIENT MESSAGE (OUTPATIENT)
Dept: FAMILY MEDICINE CLINIC | Facility: CLINIC | Age: 54
End: 2019-12-01

## 2019-12-02 NOTE — TELEPHONE ENCOUNTER
From: Marylou Fuentes  To: Danny Olvera MD  Sent: 12/1/2019 7:36 PM CST  Subject: Test Results Question    Hi Dr. Romeo Astorga you had a relaxing holiday. I had a question on my blood work. i know I can look at the results but really do not know fo

## 2019-12-04 ENCOUNTER — PATIENT MESSAGE (OUTPATIENT)
Dept: FAMILY MEDICINE CLINIC | Facility: CLINIC | Age: 54
End: 2019-12-04

## 2019-12-04 NOTE — TELEPHONE ENCOUNTER
From: Digna Hylton  To: Jero Sánchez MD  Sent: 12/4/2019 7:00 AM CST  Subject: Visit Corrie Black,    Thank you for getting back with me. Just real quick question.  When I start taking calcium and vit D, will that be a proble

## 2020-01-14 ENCOUNTER — PATIENT MESSAGE (OUTPATIENT)
Dept: FAMILY MEDICINE CLINIC | Facility: CLINIC | Age: 55
End: 2020-01-14

## 2020-01-14 NOTE — TELEPHONE ENCOUNTER
From: Marylou Fuentes  To: Danny Olvera MD  Sent: 1/14/2020 6:24 AM CST  Subject: Non-Urgent Medical Question    Hi Dr. Clarence Gerardo! I have a couple concerns. When I go to bed at night my bones are aching .  Feet, hands , legs, and u

## 2020-01-14 NOTE — TELEPHONE ENCOUNTER
From: Marylou Fuentes  To: Danny Olvera MD  Sent: 1/14/2020 6:44 AM CST  Subject: Non-Urgent Som Fan,    It’s me again. I have another question concerning my three kids.  Recently my son Jason Campbell who is 25 went to the doctor for

## 2020-01-14 NOTE — TELEPHONE ENCOUNTER
Called and spoke to patient, advised to take vitamin D 2000 iu daily, patients last vitamin D last year was 25. Advised to take it for a month and if she is not feeling better to follow up with me.

## 2020-02-25 ENCOUNTER — OFFICE VISIT (OUTPATIENT)
Dept: FAMILY MEDICINE CLINIC | Facility: CLINIC | Age: 55
End: 2020-02-25
Payer: COMMERCIAL

## 2020-02-25 ENCOUNTER — APPOINTMENT (OUTPATIENT)
Dept: LAB | Age: 55
End: 2020-02-25
Attending: FAMILY MEDICINE
Payer: COMMERCIAL

## 2020-02-25 VITALS
HEIGHT: 65 IN | RESPIRATION RATE: 18 BRPM | TEMPERATURE: 98 F | OXYGEN SATURATION: 98 % | DIASTOLIC BLOOD PRESSURE: 76 MMHG | SYSTOLIC BLOOD PRESSURE: 112 MMHG | BODY MASS INDEX: 22.16 KG/M2 | HEART RATE: 82 BPM | WEIGHT: 133 LBS

## 2020-02-25 DIAGNOSIS — H61.22 IMPACTED CERUMEN, LEFT EAR: ICD-10-CM

## 2020-02-25 DIAGNOSIS — M79.641 BILATERAL HAND PAIN: ICD-10-CM

## 2020-02-25 DIAGNOSIS — M79.672 FOOT PAIN, BILATERAL: ICD-10-CM

## 2020-02-25 DIAGNOSIS — R09.82 POST-NASAL DRAINAGE: ICD-10-CM

## 2020-02-25 DIAGNOSIS — E55.9 VITAMIN D DEFICIENCY: ICD-10-CM

## 2020-02-25 DIAGNOSIS — M79.642 BILATERAL HAND PAIN: ICD-10-CM

## 2020-02-25 DIAGNOSIS — M79.671 FOOT PAIN, BILATERAL: Primary | ICD-10-CM

## 2020-02-25 DIAGNOSIS — M23.8X1 CREPITUS OF BOTH KNEE JOINTS: ICD-10-CM

## 2020-02-25 DIAGNOSIS — M79.671 FOOT PAIN, BILATERAL: ICD-10-CM

## 2020-02-25 DIAGNOSIS — H60.311 ACUTE DIFFUSE OTITIS EXTERNA OF RIGHT EAR: ICD-10-CM

## 2020-02-25 DIAGNOSIS — M79.672 FOOT PAIN, BILATERAL: Primary | ICD-10-CM

## 2020-02-25 DIAGNOSIS — M23.8X2 CREPITUS OF BOTH KNEE JOINTS: ICD-10-CM

## 2020-02-25 DIAGNOSIS — Q66.70 HIGH FOOT ARCH: ICD-10-CM

## 2020-02-25 LAB
CRP SERPL-MCNC: 0.49 MG/DL (ref ?–0.3)
RHEUMATOID FACT SERPL-ACNC: <10 IU/ML (ref ?–15)
SED RATE-ML: 10 MM/HR (ref 0–25)
VIT D+METAB SERPL-MCNC: 25.8 NG/ML (ref 30–100)

## 2020-02-25 PROCEDURE — 86431 RHEUMATOID FACTOR QUANT: CPT | Performed by: FAMILY MEDICINE

## 2020-02-25 PROCEDURE — 86140 C-REACTIVE PROTEIN: CPT | Performed by: FAMILY MEDICINE

## 2020-02-25 PROCEDURE — 85652 RBC SED RATE AUTOMATED: CPT | Performed by: FAMILY MEDICINE

## 2020-02-25 PROCEDURE — 99214 OFFICE O/P EST MOD 30 MIN: CPT | Performed by: FAMILY MEDICINE

## 2020-02-25 PROCEDURE — 82306 VITAMIN D 25 HYDROXY: CPT | Performed by: FAMILY MEDICINE

## 2020-02-25 NOTE — PATIENT INSTRUCTIONS
Use the ciprodex drops you have at home for 1 week. If ear pain worsens please follow up. Please use debrox in the left ear canal.    Please follow up wit podiatrist for foot pain. Will call with lab results when available.      If post nasal drainag

## 2020-02-25 NOTE — PROGRESS NOTES
Alena Mayer is a 49year old female.   Patient presents with:  Joint Pain: keeps patient awake at night    HPI:   Alena Mayer is a 49year old female    Complaining of pain in her feet for a few months,on her bunion and back on her ankles, worse when as per HPI  NEURO: denies headaches    EXAM:   /76   Pulse 82   Temp 97.9 °F (36.6 °C) (Temporal)   Resp 18   Ht 65\"   Wt 133 lb (60.3 kg)   SpO2 98%   BMI 22.13 kg/m²   GENERAL: well developed, well nourished,in no apparent distress  SKIN: no rashe

## 2020-02-28 ENCOUNTER — OFFICE VISIT (OUTPATIENT)
Dept: PODIATRY CLINIC | Facility: CLINIC | Age: 55
End: 2020-02-28
Payer: COMMERCIAL

## 2020-02-28 DIAGNOSIS — M20.11 VALGUS DEFORMITY OF BOTH GREAT TOES: ICD-10-CM

## 2020-02-28 DIAGNOSIS — M79.672 BILATERAL FOOT PAIN: Primary | ICD-10-CM

## 2020-02-28 DIAGNOSIS — M79.671 BILATERAL FOOT PAIN: Primary | ICD-10-CM

## 2020-02-28 DIAGNOSIS — M77.41 METATARSALGIA OF BOTH FEET: ICD-10-CM

## 2020-02-28 DIAGNOSIS — M25.80 SESAMOIDITIS: ICD-10-CM

## 2020-02-28 DIAGNOSIS — M77.42 METATARSALGIA OF BOTH FEET: ICD-10-CM

## 2020-02-28 DIAGNOSIS — M20.12 VALGUS DEFORMITY OF BOTH GREAT TOES: ICD-10-CM

## 2020-02-28 PROCEDURE — 99203 OFFICE O/P NEW LOW 30 MIN: CPT | Performed by: PODIATRIST

## 2020-02-28 NOTE — PROGRESS NOTES
Patient was given the CPT code for orthotics and the diagnosis codes, per Dr Yazmin Bethea, to seek coverage approval from their insurance company.  Patient was instructed to call and make an orthotic casting appointment when they are ready to move forward with o

## 2020-02-29 NOTE — PROGRESS NOTES
Grace Thakkar is a 49year old female. Patient presents with:  Consult: pt runs and walks a lot, and is on her feet at work (). pt's PCP noticed pt may need an insole secondary to pain at the base of her right hallux.  right foot is achy in the mo Hypertension Father    • Lipids Father         hyperlipidemia   • Prostate Cancer Father 72   • Colon Polyps Father    • Allergies Son    • Cancer Maternal Grandfather         mesothelioma dx age 66's   • Colon Polyps Mother    • No Known Problems Daughter feet.    ASSESSMENT AND PLAN:   Diagnoses and all orders for this visit:    Bilateral foot pain  -     XR FOOT WEIGHTBEARING (2 VIEWS), LEFT   (CPT=73620)  -     XR FOOT WEIGHTBEARING (2 VIEWS), RIGHT   (CPT=73620)    Valgus deformity of both great toes

## 2020-03-01 PROBLEM — Z01.419 WELL WOMAN EXAM WITH ROUTINE GYNECOLOGICAL EXAM: Status: RESOLVED | Noted: 2018-11-12 | Resolved: 2020-03-01

## 2020-05-15 DIAGNOSIS — E55.9 VITAMIN D DEFICIENCY: ICD-10-CM

## 2020-05-15 RX ORDER — ERGOCALCIFEROL 1.25 MG/1
50000 CAPSULE ORAL WEEKLY
Qty: 4 CAPSULE | Refills: 0 | OUTPATIENT
Start: 2020-05-15 | End: 2020-06-14

## 2020-05-24 ENCOUNTER — PATIENT MESSAGE (OUTPATIENT)
Dept: FAMILY MEDICINE CLINIC | Facility: CLINIC | Age: 55
End: 2020-05-24

## 2020-05-24 DIAGNOSIS — M79.10 MYALGIA: ICD-10-CM

## 2020-05-24 DIAGNOSIS — E03.9 HYPOTHYROIDISM, UNSPECIFIED TYPE: Primary | ICD-10-CM

## 2020-05-26 NOTE — TELEPHONE ENCOUNTER
From: Marylou Fuentes  To: Danny Olvera MD  Sent: 5/24/2020 10:28 AM CDT  Subject: Other    Hi Batsheva, when you send in my order to have blood drawn can you ask Dr. Lisa Fan if I should have my calcium checked too besides my vitamin D.?  Or thyroid check

## 2020-05-27 ENCOUNTER — APPOINTMENT (OUTPATIENT)
Dept: LAB | Age: 55
End: 2020-05-27
Attending: FAMILY MEDICINE
Payer: COMMERCIAL

## 2020-05-27 ENCOUNTER — PATIENT MESSAGE (OUTPATIENT)
Dept: FAMILY MEDICINE CLINIC | Facility: CLINIC | Age: 55
End: 2020-05-27

## 2020-05-27 DIAGNOSIS — M79.10 MYALGIA: ICD-10-CM

## 2020-05-27 DIAGNOSIS — E03.9 HYPOTHYROIDISM, UNSPECIFIED TYPE: ICD-10-CM

## 2020-05-27 DIAGNOSIS — R79.82 ELEVATED C-REACTIVE PROTEIN (CRP): ICD-10-CM

## 2020-05-27 DIAGNOSIS — E55.9 VITAMIN D DEFICIENCY: ICD-10-CM

## 2020-05-27 PROCEDURE — 82306 VITAMIN D 25 HYDROXY: CPT | Performed by: FAMILY MEDICINE

## 2020-05-27 PROCEDURE — 84439 ASSAY OF FREE THYROXINE: CPT | Performed by: FAMILY MEDICINE

## 2020-05-27 PROCEDURE — 84443 ASSAY THYROID STIM HORMONE: CPT | Performed by: FAMILY MEDICINE

## 2020-05-27 PROCEDURE — 36415 COLL VENOUS BLD VENIPUNCTURE: CPT | Performed by: FAMILY MEDICINE

## 2020-05-27 PROCEDURE — 86140 C-REACTIVE PROTEIN: CPT | Performed by: FAMILY MEDICINE

## 2020-05-27 PROCEDURE — 80053 COMPREHEN METABOLIC PANEL: CPT | Performed by: FAMILY MEDICINE

## 2020-05-29 NOTE — TELEPHONE ENCOUNTER
From: Knea Ibarra  To: Santhosh Harvey MD  Sent: 5/27/2020 10:05 AM CDT  Subject: Referral Request    Hi dr Brenda Barajas For the message again.  I have a question, I am going in at 2:30 on [address] , [address] medical place that is, to g

## 2020-05-29 NOTE — TELEPHONE ENCOUNTER
From: Alena Mayer  To: Raza Lai MD  Sent: 5/27/2020 10:08 AM CDT  Subject: Test Results Question    Hi Dr. Candelario Galindo to send you another message but I forgot to tell you I feel fine Jennifer Heady never had any symptoms whatsoever.  Forgot to tell

## 2020-08-22 ENCOUNTER — HOSPITAL ENCOUNTER (EMERGENCY)
Facility: HOSPITAL | Age: 55
Discharge: HOME | End: 2020-08-22
Attending: EMERGENCY MEDICINE
Payer: COMMERCIAL

## 2020-08-22 VITALS
OXYGEN SATURATION: 97 % | TEMPERATURE: 99 F | DIASTOLIC BLOOD PRESSURE: 78 MMHG | SYSTOLIC BLOOD PRESSURE: 120 MMHG | RESPIRATION RATE: 17 BRPM | HEART RATE: 77 BPM

## 2020-08-22 DIAGNOSIS — R50.9 FEVER, UNSPECIFIED FEVER CAUSE: ICD-10-CM

## 2020-08-22 DIAGNOSIS — R09.81 SINUS CONGESTION: Primary | ICD-10-CM

## 2020-08-22 PROCEDURE — 99283 EMERGENCY DEPT VISIT LOW MDM: CPT

## 2020-08-22 RX ORDER — AZITHROMYCIN 250 MG/1
TABLET, FILM COATED ORAL
Qty: 1 PACKAGE | Refills: 0 | Status: SHIPPED | OUTPATIENT
Start: 2020-08-22 | End: 2020-08-27

## 2020-08-22 RX ORDER — AMOXICILLIN AND CLAVULANATE POTASSIUM 875; 125 MG/1; MG/1
1 TABLET, FILM COATED ORAL 2 TIMES DAILY
Qty: 20 TABLET | Refills: 0 | Status: SHIPPED | OUTPATIENT
Start: 2020-08-22 | End: 2020-08-22

## 2020-08-23 NOTE — ED PROVIDER NOTES
Patient Seen in: [facility] Emergency Department      History   Patient presents with:  Cough/URI    Stated Complaint: coughing, back pain ,headache wants covid testing    HPI    60-year-old patient presents to the emergency department with concern as noted above.     Physical Exam     ED Triage Vitals   BP 08/22/20 1654 120/78   Pulse 08/22/20 1650 77   Resp 08/22/20 1650 17   Temp 08/22/20 1650 98.6 °F (37 °C)   Temp src 08/22/20 1650 Oral   SpO2 08/22/20 1650 97 %   O2 Device 08/22/20 1650 None (Ro Thought Content: Thought content normal.      Comments: Very anxious             ED Course     Labs Reviewed   SARS-COV-2 BY PCR ()                  MDM     Patient presents to the emergency department cough and fever today.   This could represent a si

## 2020-08-25 ENCOUNTER — PATIENT MESSAGE (OUTPATIENT)
Dept: FAMILY MEDICINE CLINIC | Facility: CLINIC | Age: 55
End: 2020-08-25

## 2020-08-25 LAB — SARS-COV-2 RNA RESP QL NAA+PROBE: DETECTED

## 2020-08-25 NOTE — TELEPHONE ENCOUNTER
From: Tray Ayala  To: Isadora Her MD  Sent: 8/25/2020 7:17 AM CDT  Subject: Test Results Question    Hi dr. Ivana Veliz all is well.  I am not sure if you get information on your patience but I was tested Saturday for COVID and it looks like

## 2020-08-25 NOTE — TELEPHONE ENCOUNTER
Saturday had a headache, Temp   Went to 101 - back ache and neck ache    Seemed to have a \"cold\"  Also had Post nasal drip    No cough - at times cleared throat but did not expect it to be COVID    Pt was till running and no concerns     Son and hu

## 2020-08-27 ENCOUNTER — TELEMEDICINE (OUTPATIENT)
Dept: FAMILY MEDICINE CLINIC | Facility: CLINIC | Age: 55
End: 2020-08-27

## 2020-08-27 DIAGNOSIS — U07.1 COVID-19 VIRUS INFECTION: Primary | ICD-10-CM

## 2020-08-27 PROCEDURE — 99213 OFFICE O/P EST LOW 20 MIN: CPT | Performed by: FAMILY MEDICINE

## 2020-08-27 NOTE — PROGRESS NOTES
Nikki Burr is a 49year old female. Patient presents with:  Fatigue  Headache    This visit is conducted using telemedicine with live interactive video and audio. Giovana Galvan verbally consents to virtual video visit.    Patient understands and accepts pain and denies heartburn  NEURO: denies headaches    EXAM:   There were no vitals taken for this visit.   GENERAL: well developed, well nourished,in no apparent distress  HEENT: atraumatic, normocephalic  NECK: supple  LUNGS: act of breathing is normal  CA

## 2020-08-30 ENCOUNTER — PATIENT MESSAGE (OUTPATIENT)
Dept: FAMILY MEDICINE CLINIC | Facility: CLINIC | Age: 55
End: 2020-08-30

## 2020-08-30 DIAGNOSIS — R05.9 COUGH: Primary | ICD-10-CM

## 2020-08-31 RX ORDER — BENZONATATE 200 MG/1
200 CAPSULE ORAL 3 TIMES DAILY PRN
Qty: 21 CAPSULE | Refills: 0 | Status: SHIPPED | OUTPATIENT
Start: 2020-08-31 | End: 2020-09-07

## 2020-08-31 NOTE — TELEPHONE ENCOUNTER
Called and spoke to patient, states is feeling better but has occasional cough, sometimes productive, would like to get something for cough. Prescription for tessalon sent, patient advised to monitor cough and breathing and advised to check her pulse ox.

## 2020-08-31 NOTE — TELEPHONE ENCOUNTER
From: Adolfo Ramírez  To: Blessing Mckinley MD  Sent: 8/30/2020 12:04 PM CDT  Subject: Visit Yumiko Chase all is well. Just had a few questions about the virus.  I think mentioned you were going to call me Saturday and was

## 2020-09-06 ENCOUNTER — PATIENT MESSAGE (OUTPATIENT)
Dept: FAMILY MEDICINE CLINIC | Facility: CLINIC | Age: 55
End: 2020-09-06

## 2020-09-08 ENCOUNTER — PATIENT MESSAGE (OUTPATIENT)
Dept: FAMILY MEDICINE CLINIC | Facility: CLINIC | Age: 55
End: 2020-09-08

## 2020-09-08 NOTE — TELEPHONE ENCOUNTER
From: Ozzy Sutherland  To: Daryle Powers, MD  Sent: 9/6/2020 8:57 PM CDT  Subject: Non-Urgent Rambo Jacobs,    Just had a question. I feel good but still have the feeling that I need to cough like a tickle? When sleeping I am fine.

## 2020-09-09 NOTE — TELEPHONE ENCOUNTER
From: Teddy Silverio  To: Machelle Estrella MD  Sent: 9/8/2020 2:09 PM CDT  Subject: Prescription Question    Dr. Rashel Monroe,    Thank you for getting back with me. Sorry to keep bothering you. My  freaking me out! He thinks I am contagious still,!

## 2020-09-29 ENCOUNTER — TELEPHONE (OUTPATIENT)
Dept: FAMILY MEDICINE CLINIC | Facility: CLINIC | Age: 55
End: 2020-09-29

## 2020-09-29 NOTE — TELEPHONE ENCOUNTER
Patient scheduled a physical for 10/13 for Physical.  (Ok per patient with insurance.)    Patient would like to schedule her labs for right after appt. Patient asked Would Dr. Chencho Doran please order complete labs for patient?   She asked specifically for Iron,

## 2020-09-29 NOTE — TELEPHONE ENCOUNTER
If she is planning on getting her labs after her appointment will place orders on the day of her appointment.

## 2020-09-29 NOTE — TELEPHONE ENCOUNTER
LOV 2/25/20  Last labs 5/27/20  Future Appointments   Date Time Provider Ji Valeroi   10/13/2020  9:00 AM Mo Peck MD [facility]     Await to further discuss at scheduled visit? Please advise. Thank you.

## 2020-09-30 NOTE — TELEPHONE ENCOUNTER
Called pt to inform confirmed with PCP will place orders, most likely fasting, at time of OV and may complete after visit at lab. No further questions or concerns. Pt verbalized understanding and agreed with POC.

## 2020-10-13 ENCOUNTER — LAB ENCOUNTER (OUTPATIENT)
Dept: LAB | Age: 55
End: 2020-10-13
Attending: FAMILY MEDICINE
Payer: COMMERCIAL

## 2020-10-13 ENCOUNTER — OFFICE VISIT (OUTPATIENT)
Dept: FAMILY MEDICINE CLINIC | Facility: CLINIC | Age: 55
End: 2020-10-13
Payer: COMMERCIAL

## 2020-10-13 VITALS
BODY MASS INDEX: 20.99 KG/M2 | SYSTOLIC BLOOD PRESSURE: 102 MMHG | WEIGHT: 126 LBS | RESPIRATION RATE: 18 BRPM | HEIGHT: 65 IN | DIASTOLIC BLOOD PRESSURE: 80 MMHG | HEART RATE: 60 BPM | OXYGEN SATURATION: 98 % | TEMPERATURE: 98 F

## 2020-10-13 DIAGNOSIS — H60.593 OTHER NONINFECTIVE ACUTE OTITIS EXTERNA OF BOTH EARS: ICD-10-CM

## 2020-10-13 DIAGNOSIS — Z00.00 ROUTINE GENERAL MEDICAL EXAMINATION AT A HEALTH CARE FACILITY: ICD-10-CM

## 2020-10-13 DIAGNOSIS — Z23 NEED FOR VACCINATION: ICD-10-CM

## 2020-10-13 DIAGNOSIS — H61.23 IMPACTED CERUMEN OF BOTH EARS: ICD-10-CM

## 2020-10-13 DIAGNOSIS — Z01.84 IMMUNITY STATUS TESTING: ICD-10-CM

## 2020-10-13 DIAGNOSIS — Z12.31 ENCOUNTER FOR SCREENING MAMMOGRAM FOR MALIGNANT NEOPLASM OF BREAST: ICD-10-CM

## 2020-10-13 DIAGNOSIS — E03.9 HYPOTHYROIDISM, UNSPECIFIED TYPE: ICD-10-CM

## 2020-10-13 DIAGNOSIS — U07.1 COVID-19 VIRUS INFECTION: ICD-10-CM

## 2020-10-13 DIAGNOSIS — Z00.00 ROUTINE GENERAL MEDICAL EXAMINATION AT A HEALTH CARE FACILITY: Primary | ICD-10-CM

## 2020-10-13 PROCEDURE — 99213 OFFICE O/P EST LOW 20 MIN: CPT | Performed by: FAMILY MEDICINE

## 2020-10-13 PROCEDURE — 99396 PREV VISIT EST AGE 40-64: CPT | Performed by: FAMILY MEDICINE

## 2020-10-13 PROCEDURE — 69209 REMOVE IMPACTED EAR WAX UNI: CPT | Performed by: FAMILY MEDICINE

## 2020-10-13 PROCEDURE — 86787 VARICELLA-ZOSTER ANTIBODY: CPT | Performed by: FAMILY MEDICINE

## 2020-10-13 PROCEDURE — 80061 LIPID PANEL: CPT | Performed by: FAMILY MEDICINE

## 2020-10-13 PROCEDURE — 3074F SYST BP LT 130 MM HG: CPT | Performed by: FAMILY MEDICINE

## 2020-10-13 PROCEDURE — 86769 SARS-COV-2 COVID-19 ANTIBODY: CPT | Performed by: FAMILY MEDICINE

## 2020-10-13 PROCEDURE — 90686 IIV4 VACC NO PRSV 0.5 ML IM: CPT | Performed by: FAMILY MEDICINE

## 2020-10-13 PROCEDURE — 84439 ASSAY OF FREE THYROXINE: CPT | Performed by: FAMILY MEDICINE

## 2020-10-13 PROCEDURE — 3008F BODY MASS INDEX DOCD: CPT | Performed by: FAMILY MEDICINE

## 2020-10-13 PROCEDURE — 90471 IMMUNIZATION ADMIN: CPT | Performed by: FAMILY MEDICINE

## 2020-10-13 PROCEDURE — 80050 GENERAL HEALTH PANEL: CPT | Performed by: FAMILY MEDICINE

## 2020-10-13 PROCEDURE — 3079F DIAST BP 80-89 MM HG: CPT | Performed by: FAMILY MEDICINE

## 2020-10-13 RX ORDER — HYDROCORTISONE AND ACETIC ACID 1.1; 2.41 G/100ML; G/100ML
3 SOLUTION AURICULAR (OTIC) 2 TIMES DAILY
Qty: 10 ML | Refills: 0 | Status: SHIPPED | OUTPATIENT
Start: 2020-10-13 | End: 2020-10-23

## 2020-10-13 NOTE — PROGRESS NOTES
Waqar Mueller is a 48year old female.   Patient presents with:  Physical    HPI:   Waqar Mueller is a 48year old female seen for her annual physical.  Pap done in 2018 was normal.  States does do breast self exam, mammogram done at [facility] lat year was nor Daughter      SOCIAL HISTORY:   Social History    Socioeconomic History      Marital status:       Spouse name: Moody Blizzard      Number of children: 2    Tobacco Use      Smoking status: Never Smoker      Smokeless tobacco: Never Used    Substance and S canal normal. Cerumen present . Left Ear: Tympanic membrane, external ear and ear canal normal. Cerumen present. Nose: Nose normal.   Mouth/Throat: Oropharynx is clear and moist.   Eyes: Pupils are equal, round, and reactive to light.  Conjunctivae and EO Future    Impacted cerumen of both ears  Attempted cerumen removal with lavage, partially removed.     Hypothyroidism, unspecified type  -     TSH+FREE T4; Future    Other noninfective acute otitis externa of both ears  -     Hydrocortisone-Acetic Acid 1-2

## 2020-10-14 ENCOUNTER — PATIENT MESSAGE (OUTPATIENT)
Dept: FAMILY MEDICINE CLINIC | Facility: CLINIC | Age: 55
End: 2020-10-14

## 2020-10-14 NOTE — TELEPHONE ENCOUNTER
From: Nena Ramirez  To: Lorelei Apgar, MD  Sent: 10/14/2020 7:03 AM CDT  Subject: Non-Urgent Medical Question    Hi Dr Jas Jimenez,    So nice to see you in person the other day.  You had mentioned a couple things I could take for my achiness I explained

## 2020-10-15 ENCOUNTER — PATIENT MESSAGE (OUTPATIENT)
Dept: FAMILY MEDICINE CLINIC | Facility: CLINIC | Age: 55
End: 2020-10-15

## 2020-10-15 DIAGNOSIS — E03.9 HYPOTHYROIDISM, UNSPECIFIED TYPE: ICD-10-CM

## 2020-10-16 ENCOUNTER — TELEPHONE (OUTPATIENT)
Dept: FAMILY MEDICINE CLINIC | Facility: CLINIC | Age: 55
End: 2020-10-16

## 2020-10-16 RX ORDER — LEVOTHYROXINE SODIUM 88 MCG
TABLET ORAL
Qty: 90 TABLET | Refills: 3 | Status: SHIPPED | OUTPATIENT
Start: 2020-10-16 | End: 2021-10-21

## 2020-10-16 NOTE — TELEPHONE ENCOUNTER
From: Laureen Brian  To: Talha Welch MD  Sent: 10/15/2020 12:53 PM CDT  Subject: Prescription Question    Hi dr Yesi Hernandez,    I need a new refill for thyroid(synthroid 88 mg).  Also I was trying to get some supplements for my aches that you recommende

## 2020-10-16 NOTE — TELEPHONE ENCOUNTER
I have pended the refill although can you please comment on other questions from the patient?      Thanks    Last TSH 10/13/20  Seen 10/13/20 for annual

## 2020-10-20 ENCOUNTER — NURSE ONLY (OUTPATIENT)
Dept: FAMILY MEDICINE CLINIC | Facility: CLINIC | Age: 55
End: 2020-10-20
Payer: COMMERCIAL

## 2020-10-20 PROCEDURE — 90750 HZV VACC RECOMBINANT IM: CPT | Performed by: FAMILY MEDICINE

## 2020-10-20 PROCEDURE — 90471 IMMUNIZATION ADMIN: CPT | Performed by: FAMILY MEDICINE

## 2020-11-16 ENCOUNTER — TELEPHONE (OUTPATIENT)
Dept: FAMILY MEDICINE CLINIC | Facility: CLINIC | Age: 55
End: 2020-11-16

## 2020-11-16 NOTE — TELEPHONE ENCOUNTER
Patient received notification that she has outstanding lab work.  I looked and it seems it might be due to mammogram.

## 2020-11-17 NOTE — TELEPHONE ENCOUNTER
Lm for pt ([id number] [address] Bernal per HIPAA) to inform noted pt completed mammo 10/14/20 with recommendation to repeat in 1 year. Also had completed fasting labs on 10/13/20. Noted outreach letter was sent on 11/12/20- apologized for the confusion.  Informed no further work up

## 2021-01-15 ENCOUNTER — TELEPHONE (OUTPATIENT)
Dept: FAMILY MEDICINE CLINIC | Facility: CLINIC | Age: 56
End: 2021-01-15

## 2021-01-15 NOTE — TELEPHONE ENCOUNTER
Pt scheduled appt for 2nd shingles shot     Future Appointments   Date Time Provider Ji Cordon   1/26/2021  9:30 AM [facility] NURSE [facility]

## 2021-01-15 NOTE — TELEPHONE ENCOUNTER
Dr. Yoshi Contreras,  Please advise   Order pended for your review    Zoster Vaccine Recombinant Adjuvanted (Shingrix) 10/20/2020     Future Appointments   Date Time Provider Ji Cordon   1/26/2021  9:30 AM [facility] NURSE [facility]

## 2021-01-25 ENCOUNTER — TELEPHONE (OUTPATIENT)
Dept: FAMILY MEDICINE CLINIC | Facility: CLINIC | Age: 56
End: 2021-01-25

## 2021-01-25 NOTE — TELEPHONE ENCOUNTER
Pt called stating she is a pre-. She wants to know how [facility] will contact her when she can get her Covid vaccine.

## 2021-01-28 ENCOUNTER — NURSE ONLY (OUTPATIENT)
Dept: FAMILY MEDICINE CLINIC | Facility: CLINIC | Age: 56
End: 2021-01-28
Payer: COMMERCIAL

## 2021-01-28 PROCEDURE — 90471 IMMUNIZATION ADMIN: CPT | Performed by: FAMILY MEDICINE

## 2021-01-28 PROCEDURE — 90750 HZV VACC RECOMBINANT IM: CPT | Performed by: FAMILY MEDICINE

## 2021-03-17 ENCOUNTER — PATIENT MESSAGE (OUTPATIENT)
Dept: FAMILY MEDICINE CLINIC | Facility: CLINIC | Age: 56
End: 2021-03-17

## 2021-03-17 NOTE — TELEPHONE ENCOUNTER
From: Bethanie Preston  To: Ramon Britt MD  Sent: 3/17/2021 10:15 AM CDT  Subject: Other    Hi Dr. Robbie Farris.,    I had a question I just got a my chart message saying I could set up my first Covid vaccine shot. I already received my first shot March 3.

## 2021-04-14 ENCOUNTER — OFFICE VISIT (OUTPATIENT)
Dept: FAMILY MEDICINE CLINIC | Facility: CLINIC | Age: 56
End: 2021-04-14
Payer: COMMERCIAL

## 2021-04-14 ENCOUNTER — LAB ENCOUNTER (OUTPATIENT)
Dept: LAB | Age: 56
End: 2021-04-14
Attending: FAMILY MEDICINE
Payer: COMMERCIAL

## 2021-04-14 VITALS
OXYGEN SATURATION: 98 % | SYSTOLIC BLOOD PRESSURE: 102 MMHG | WEIGHT: 128 LBS | TEMPERATURE: 97 F | HEART RATE: 87 BPM | HEIGHT: 65 IN | DIASTOLIC BLOOD PRESSURE: 72 MMHG | BODY MASS INDEX: 21.33 KG/M2 | RESPIRATION RATE: 18 BRPM

## 2021-04-14 DIAGNOSIS — E03.9 HYPOTHYROIDISM, UNSPECIFIED TYPE: ICD-10-CM

## 2021-04-14 DIAGNOSIS — M79.10 MYALGIA: ICD-10-CM

## 2021-04-14 DIAGNOSIS — H81.11 BENIGN PAROXYSMAL POSITIONAL VERTIGO OF RIGHT EAR: Primary | ICD-10-CM

## 2021-04-14 DIAGNOSIS — H60.503 ACUTE OTITIS EXTERNA OF BOTH EARS, UNSPECIFIED TYPE: ICD-10-CM

## 2021-04-14 PROCEDURE — 3078F DIAST BP <80 MM HG: CPT | Performed by: FAMILY MEDICINE

## 2021-04-14 PROCEDURE — 82306 VITAMIN D 25 HYDROXY: CPT | Performed by: FAMILY MEDICINE

## 2021-04-14 PROCEDURE — 36415 COLL VENOUS BLD VENIPUNCTURE: CPT | Performed by: FAMILY MEDICINE

## 2021-04-14 PROCEDURE — 86038 ANTINUCLEAR ANTIBODIES: CPT | Performed by: FAMILY MEDICINE

## 2021-04-14 PROCEDURE — 84439 ASSAY OF FREE THYROXINE: CPT | Performed by: FAMILY MEDICINE

## 2021-04-14 PROCEDURE — 84443 ASSAY THYROID STIM HORMONE: CPT | Performed by: FAMILY MEDICINE

## 2021-04-14 PROCEDURE — 3074F SYST BP LT 130 MM HG: CPT | Performed by: FAMILY MEDICINE

## 2021-04-14 PROCEDURE — 99214 OFFICE O/P EST MOD 30 MIN: CPT | Performed by: FAMILY MEDICINE

## 2021-04-14 PROCEDURE — 3008F BODY MASS INDEX DOCD: CPT | Performed by: FAMILY MEDICINE

## 2021-04-14 RX ORDER — HYDROCORTISONE AND ACETIC ACID 1.1; 2.41 G/100ML; G/100ML
4 SOLUTION AURICULAR (OTIC) 2 TIMES DAILY
Qty: 10 ML | Refills: 0 | Status: SHIPPED | OUTPATIENT
Start: 2021-04-14 | End: 2021-04-24

## 2021-04-14 NOTE — PROGRESS NOTES
Ashley Kelly is a 48year old female.   Patient presents with:  Dizziness    HPI:   Ashley Kelly is a 48year old female complaining of Dizziness when she turns her head or bends forward and gets up, happening for the past 1 month and a half.    + itchi ear canals are narrow, + edema  NECK: supple  LUNGS: clear to auscultation  CARDIO: RRR without murmur  NEURO: CN 2-12, no focal motor or sensory deficits    ASSESSMENT AND PLAN:   Devaughn Jacob was seen today for dizziness.     Diagnoses and all orders for this vi

## 2021-04-14 NOTE — PATIENT INSTRUCTIONS
Benign Paroxysmal Positional Vertigo     Your health care provider may move your head in certain ways to treat your BPPV. Benign paroxysmal positional vertigo (BPPV) is a problem with the inner ear. The inner ear contains the vestibular system.  This sy your vestibular or nervous systems. Treatment for BPPV  Your healthcare provider may try to move the calcium crystals. This is done by having you move your head and neck in certain ways. This treatment is safe and often works well.  You may also be told t

## 2021-06-27 NOTE — TELEPHONE ENCOUNTER
From: Alena Mayer  To: Mamie Combs MD  Sent: 7/24/2018 6:30 AM CDT  Subject: Prescription Question    Hi dr. Ta Marino,    Received your email with my change in synthroid and iron level is good now. I have been taking the iron pill every day.  Demi Tijerina No

## 2021-08-02 ENCOUNTER — OFFICE VISIT (OUTPATIENT)
Dept: OBGYN CLINIC | Facility: CLINIC | Age: 56
End: 2021-08-02
Payer: COMMERCIAL

## 2021-08-02 VITALS
SYSTOLIC BLOOD PRESSURE: 118 MMHG | BODY MASS INDEX: 20.83 KG/M2 | WEIGHT: 125 LBS | DIASTOLIC BLOOD PRESSURE: 62 MMHG | HEIGHT: 65 IN | HEART RATE: 68 BPM

## 2021-08-02 DIAGNOSIS — Z12.31 ENCOUNTER FOR MAMMOGRAM TO ESTABLISH BASELINE MAMMOGRAM: Primary | ICD-10-CM

## 2021-08-02 DIAGNOSIS — Z12.4 PAPANICOLAOU SMEAR FOR CERVICAL CANCER SCREENING: ICD-10-CM

## 2021-08-02 DIAGNOSIS — Z01.419 WELL WOMAN EXAM WITH ROUTINE GYNECOLOGICAL EXAM: ICD-10-CM

## 2021-08-02 DIAGNOSIS — N95.1 PERIMENOPAUSAL: ICD-10-CM

## 2021-08-02 PROCEDURE — 88175 CYTOPATH C/V AUTO FLUID REDO: CPT | Performed by: OBSTETRICS & GYNECOLOGY

## 2021-08-02 PROCEDURE — 3008F BODY MASS INDEX DOCD: CPT | Performed by: OBSTETRICS & GYNECOLOGY

## 2021-08-02 PROCEDURE — 3078F DIAST BP <80 MM HG: CPT | Performed by: OBSTETRICS & GYNECOLOGY

## 2021-08-02 PROCEDURE — 99396 PREV VISIT EST AGE 40-64: CPT | Performed by: OBSTETRICS & GYNECOLOGY

## 2021-08-02 PROCEDURE — 3074F SYST BP LT 130 MM HG: CPT | Performed by: OBSTETRICS & GYNECOLOGY

## 2021-08-02 NOTE — PROGRESS NOTES
Harry Spann is a 48year old female  No LMP recorded. Patient is premenopausal. Patient presents with:  Wellness Visit  .      No vaginal bleeding occasionally hot flashes and night sweats she does not want anything for it she occasionally has tre activity: Yes    Other Topics      Concerns:         Service: Not Asked        Blood Transfusions: Not Asked        Caffeine Concern: No        Occupational Exposure: Not Asked        Hobby Hazards: Not Asked        Sleep Concern: Not Asked No Known Problems Daughter        MEDICATIONS:    Current Outpatient Medications:   •  SYNTHROID 80 MCG Oral Tab, TAKE ONE TABLET BY MOUTH EVERY MORNING BEFORE BREAKFAST, Disp: 90 tablet, Rfl: 3    ALLERGIES:    Biaxin [Clarithromy*    PAIN    Comment:Deanna smear for cervical cancer screening  -     THINPREP PAP WITH HPV REFLEX REQUEST B; Future        Hyaluronic suppositories

## 2021-08-06 LAB — LAST PAP RESULT: NORMAL

## 2021-10-15 DIAGNOSIS — E03.9 HYPOTHYROIDISM, UNSPECIFIED TYPE: ICD-10-CM

## 2021-10-15 NOTE — TELEPHONE ENCOUNTER
LOV 4/14/2021    LAST LAB    LAST RX    Next OV   Future Appointments   Date Time Provider Ji Cordon   10/26/2021  3:00 PM Dianne Quinn MD [facility]   8/3/2022  9:00 AM Priscila Avery MD [facility]         PROTOCOL   Name f

## 2021-10-21 RX ORDER — LEVOTHYROXINE SODIUM 88 MCG
TABLET ORAL
Qty: 90 TABLET | Refills: 0 | Status: SHIPPED | OUTPATIENT
Start: 2021-10-21 | End: 2022-01-24

## 2021-10-26 ENCOUNTER — OFFICE VISIT (OUTPATIENT)
Dept: FAMILY MEDICINE CLINIC | Facility: CLINIC | Age: 56
End: 2021-10-26
Payer: COMMERCIAL

## 2021-10-26 ENCOUNTER — LAB ENCOUNTER (OUTPATIENT)
Dept: LAB | Age: 56
End: 2021-10-26
Attending: FAMILY MEDICINE
Payer: COMMERCIAL

## 2021-10-26 VITALS
DIASTOLIC BLOOD PRESSURE: 80 MMHG | HEART RATE: 67 BPM | TEMPERATURE: 97 F | OXYGEN SATURATION: 98 % | RESPIRATION RATE: 18 BRPM | WEIGHT: 125 LBS | HEIGHT: 65 IN | BODY MASS INDEX: 20.83 KG/M2 | SYSTOLIC BLOOD PRESSURE: 100 MMHG

## 2021-10-26 DIAGNOSIS — E55.9 VITAMIN D DEFICIENCY: ICD-10-CM

## 2021-10-26 DIAGNOSIS — E03.9 HYPOTHYROIDISM, UNSPECIFIED TYPE: ICD-10-CM

## 2021-10-26 DIAGNOSIS — M79.604 PAIN IN BOTH LOWER EXTREMITIES: ICD-10-CM

## 2021-10-26 DIAGNOSIS — Z00.00 ROUTINE GENERAL MEDICAL EXAMINATION AT A HEALTH CARE FACILITY: ICD-10-CM

## 2021-10-26 DIAGNOSIS — M79.605 PAIN IN BOTH LOWER EXTREMITIES: ICD-10-CM

## 2021-10-26 DIAGNOSIS — H93.8X3 IRRITATION OF EAR, BILATERAL: ICD-10-CM

## 2021-10-26 DIAGNOSIS — Z23 NEED FOR VACCINATION: ICD-10-CM

## 2021-10-26 DIAGNOSIS — Z00.00 ROUTINE GENERAL MEDICAL EXAMINATION AT A HEALTH CARE FACILITY: Primary | ICD-10-CM

## 2021-10-26 PROCEDURE — 3079F DIAST BP 80-89 MM HG: CPT | Performed by: FAMILY MEDICINE

## 2021-10-26 PROCEDURE — 90471 IMMUNIZATION ADMIN: CPT | Performed by: FAMILY MEDICINE

## 2021-10-26 PROCEDURE — 90686 IIV4 VACC NO PRSV 0.5 ML IM: CPT | Performed by: FAMILY MEDICINE

## 2021-10-26 PROCEDURE — 80050 GENERAL HEALTH PANEL: CPT | Performed by: FAMILY MEDICINE

## 2021-10-26 PROCEDURE — 80061 LIPID PANEL: CPT | Performed by: FAMILY MEDICINE

## 2021-10-26 PROCEDURE — 99396 PREV VISIT EST AGE 40-64: CPT | Performed by: FAMILY MEDICINE

## 2021-10-26 PROCEDURE — 3074F SYST BP LT 130 MM HG: CPT | Performed by: FAMILY MEDICINE

## 2021-10-26 PROCEDURE — 3008F BODY MASS INDEX DOCD: CPT | Performed by: FAMILY MEDICINE

## 2021-10-26 PROCEDURE — 84439 ASSAY OF FREE THYROXINE: CPT | Performed by: FAMILY MEDICINE

## 2021-10-26 PROCEDURE — 82306 VITAMIN D 25 HYDROXY: CPT | Performed by: FAMILY MEDICINE

## 2021-10-26 NOTE — PROGRESS NOTES
Nena Ramirez is a 55year old female.   Patient presents with:  Physical    HPI:   Nena Ramirez is a 55year old female with history of hypothyroidism seen for her annual physical.  Pap is up to date  Mammogram done last year was normal  No concerns thi education: Not on file      Highest education level: Not on file    Occupational History      Not on file    Tobacco Use      Smoking status: Never Smoker      Smokeless tobacco: Never Used    Vaping Use      Vaping Use: Never used    Substance and Sexual external ear normal.      Nose: Nose normal.   Eyes:      Extraocular Movements: Extraocular movements intact. Conjunctiva/sclera: Conjunctivae normal.      Pupils: Pupils are equal, round, and reactive to light. Neck:      Thyroid: No thyromegaly. INTERNAL    Pain in both lower extremities  -     US VENOUS INSUFFICIENCY (REFLUX) BILAT LOWER EXT SH(CPT=93970);  Future

## 2021-11-08 ENCOUNTER — MED REC SCAN ONLY (OUTPATIENT)
Dept: FAMILY MEDICINE CLINIC | Facility: CLINIC | Age: 56
End: 2021-11-08

## 2021-12-01 ENCOUNTER — TELEPHONE (OUTPATIENT)
Dept: FAMILY MEDICINE CLINIC | Facility: CLINIC | Age: 56
End: 2021-12-01

## 2021-12-01 NOTE — TELEPHONE ENCOUNTER
[place]. Needs TB skin test (last TB 2018)  Placed in PCP bin to complete. Thank you.

## 2021-12-01 NOTE — TELEPHONE ENCOUNTER
Patient dropped off a form to be completed by Dr. Pamela Bo. It is for the [address] [place] [address] [employer] she works for. Last OV was 10/26/21. Please notify patient when complete and ready for . Placed in Dr. Whitney Davis incoming fax bin in triage.

## 2021-12-03 ENCOUNTER — MED REC SCAN ONLY (OUTPATIENT)
Dept: FAMILY MEDICINE CLINIC | Facility: CLINIC | Age: 56
End: 2021-12-03

## 2021-12-03 NOTE — TELEPHONE ENCOUNTER
Called patient and informed Dr. Yesi Hernandez completed the form she dropped off. Advised to check with her place of employment to see if her TB test needs updated. The last one was in 2018. She will check with employer.   Will stop by on Monday to  th

## 2022-01-19 ENCOUNTER — HOSPITAL ENCOUNTER (OUTPATIENT)
Dept: ULTRASOUND IMAGING | Age: 57
Discharge: HOME | End: 2022-01-19
Attending: FAMILY MEDICINE
Payer: COMMERCIAL

## 2022-01-19 DIAGNOSIS — M79.605 PAIN IN BOTH LOWER EXTREMITIES: ICD-10-CM

## 2022-01-19 DIAGNOSIS — M79.604 PAIN IN BOTH LOWER EXTREMITIES: ICD-10-CM

## 2022-01-19 PROCEDURE — 93970 EXTREMITY STUDY: CPT | Performed by: FAMILY MEDICINE

## 2022-01-24 ENCOUNTER — PATIENT MESSAGE (OUTPATIENT)
Dept: FAMILY MEDICINE CLINIC | Facility: CLINIC | Age: 57
End: 2022-01-24

## 2022-01-24 ENCOUNTER — TELEPHONE (OUTPATIENT)
Dept: SURGERY | Facility: CLINIC | Age: 57
End: 2022-01-24

## 2022-01-24 DIAGNOSIS — E03.9 HYPOTHYROIDISM, UNSPECIFIED TYPE: ICD-10-CM

## 2022-01-24 RX ORDER — LEVOTHYROXINE SODIUM 88 MCG
88 TABLET ORAL
Qty: 90 TABLET | Refills: 0 | Status: SHIPPED | OUTPATIENT
Start: 2022-01-24

## 2022-01-24 NOTE — TELEPHONE ENCOUNTER
LOV 10/26/2021    LAST LAB 10-26-21    LAST RX 10-21-21 90*0    Next OV   Future Appointments   Date Time Provider Ji Neris   1/27/2022  2:30 PM Aileen Pardo MD [facility] [address]   2/7/2022  2:45 PM Dimitry Arnold MD [facility]

## 2022-01-24 NOTE — TELEPHONE ENCOUNTER
From: Rocky Will  To: Brown Mccoy MD  Sent: 1/24/2022 6:49 AM CST  Subject: Synthroid refill    Hi Dr. Sachi Robert,     Just wanted to send reminder of synthroid 88 refill?     Thank you,    Semaj Rivera

## 2022-01-24 NOTE — TELEPHONE ENCOUNTER
Patient left voicemail on 910AM requesting an appointment with Dr Corbin Perez. Called [facility] back. [facility] answer[facility]

## 2022-02-07 ENCOUNTER — OFFICE VISIT (OUTPATIENT)
Dept: SURGERY | Facility: CLINIC | Age: 57
End: 2022-02-07
Payer: COMMERCIAL

## 2022-02-07 VITALS — TEMPERATURE: 97 F | SYSTOLIC BLOOD PRESSURE: 108 MMHG | HEART RATE: 69 BPM | DIASTOLIC BLOOD PRESSURE: 68 MMHG

## 2022-02-07 DIAGNOSIS — I83.813 VARICOSE VEINS OF BILATERAL LOWER EXTREMITIES WITH PAIN: Primary | ICD-10-CM

## 2022-02-07 PROCEDURE — 3078F DIAST BP <80 MM HG: CPT | Performed by: SURGERY

## 2022-02-07 PROCEDURE — 3074F SYST BP LT 130 MM HG: CPT | Performed by: SURGERY

## 2022-02-07 PROCEDURE — 99243 OFF/OP CNSLTJ NEW/EST LOW 30: CPT | Performed by: SURGERY

## 2022-04-11 ENCOUNTER — VIRTUAL PHONE E/M (OUTPATIENT)
Dept: SURGERY | Facility: CLINIC | Age: 57
End: 2022-04-11
Payer: COMMERCIAL

## 2022-04-11 DIAGNOSIS — I87.2 VENOUS INSUFFICIENCY OF LEFT LEG: ICD-10-CM

## 2022-04-11 DIAGNOSIS — I83.813 VARICOSE VEINS OF BILATERAL LOWER EXTREMITIES WITH PAIN: Primary | ICD-10-CM

## 2022-04-11 PROCEDURE — 99213 OFFICE O/P EST LOW 20 MIN: CPT | Performed by: SURGERY

## 2022-04-26 ENCOUNTER — TELEPHONE (OUTPATIENT)
Dept: SURGERY | Facility: CLINIC | Age: 57
End: 2022-04-26

## 2022-05-02 RX ORDER — LEVOTHYROXINE SODIUM 88 MCG
88 TABLET ORAL
Qty: 90 TABLET | Refills: 0 | Status: SHIPPED | OUTPATIENT
Start: 2022-05-02

## 2022-05-02 NOTE — TELEPHONE ENCOUNTER
Thyroid Supplements Protocol Passed 05/02/2022 08:02 AM   Protocol Details  TSH test in past 12 months    TSH value between 0.350 and 5.500 IU/ml    Appointment in past 12 or next 3 months

## 2022-05-18 ENCOUNTER — OFFICE VISIT (OUTPATIENT)
Dept: SURGERY | Facility: CLINIC | Age: 57
End: 2022-05-18
Payer: COMMERCIAL

## 2022-05-18 VITALS — DIASTOLIC BLOOD PRESSURE: 68 MMHG | HEART RATE: 69 BPM | TEMPERATURE: 98 F | SYSTOLIC BLOOD PRESSURE: 108 MMHG

## 2022-05-18 DIAGNOSIS — I83.813 VARICOSE VEINS OF BILATERAL LOWER EXTREMITIES WITH PAIN: ICD-10-CM

## 2022-05-18 DIAGNOSIS — I87.2 VENOUS INSUFFICIENCY OF LEFT LEG: Primary | ICD-10-CM

## 2022-05-20 ENCOUNTER — TELEPHONE (OUTPATIENT)
Dept: SURGERY | Facility: CLINIC | Age: 57
End: 2022-05-20

## 2022-05-20 NOTE — TELEPHONE ENCOUNTER
Vein ablation  On 5/18 and cannot get stocking on, sent pt to [facility] to see if they have something that would work better for pt. Reviewed signs of an clot, all questions answered.

## 2022-07-13 ENCOUNTER — OFFICE VISIT (OUTPATIENT)
Facility: LOCATION | Age: 57
End: 2022-07-13
Payer: COMMERCIAL

## 2022-07-13 VITALS
BODY MASS INDEX: 20.83 KG/M2 | SYSTOLIC BLOOD PRESSURE: 101 MMHG | HEIGHT: 65 IN | HEART RATE: 64 BPM | DIASTOLIC BLOOD PRESSURE: 61 MMHG | TEMPERATURE: 98 F | WEIGHT: 125 LBS

## 2022-07-13 DIAGNOSIS — I83.813 VARICOSE VEINS OF BILATERAL LOWER EXTREMITIES WITH PAIN: Primary | ICD-10-CM

## 2022-07-13 PROCEDURE — 99213 OFFICE O/P EST LOW 20 MIN: CPT | Performed by: SURGERY

## 2022-07-13 PROCEDURE — 3008F BODY MASS INDEX DOCD: CPT | Performed by: SURGERY

## 2022-07-13 PROCEDURE — 3074F SYST BP LT 130 MM HG: CPT | Performed by: SURGERY

## 2022-07-13 PROCEDURE — 3078F DIAST BP <80 MM HG: CPT | Performed by: SURGERY

## 2022-07-14 ENCOUNTER — TELEPHONE (OUTPATIENT)
Facility: LOCATION | Age: 57
End: 2022-07-14

## 2022-08-01 ENCOUNTER — TELEPHONE (OUTPATIENT)
Facility: LOCATION | Age: 57
End: 2022-08-01

## 2022-08-04 ENCOUNTER — OFFICE VISIT (OUTPATIENT)
Dept: OBGYN CLINIC | Facility: CLINIC | Age: 57
End: 2022-08-04
Payer: COMMERCIAL

## 2022-08-04 VITALS
SYSTOLIC BLOOD PRESSURE: 100 MMHG | HEIGHT: 65 IN | WEIGHT: 126 LBS | DIASTOLIC BLOOD PRESSURE: 62 MMHG | HEART RATE: 53 BPM | BODY MASS INDEX: 20.99 KG/M2

## 2022-08-04 DIAGNOSIS — Z12.31 ENCOUNTER FOR SCREENING MAMMOGRAM FOR BREAST CANCER: ICD-10-CM

## 2022-08-04 DIAGNOSIS — Z01.419 WELL WOMAN EXAM WITH ROUTINE GYNECOLOGICAL EXAM: Primary | ICD-10-CM

## 2022-08-04 PROCEDURE — 3008F BODY MASS INDEX DOCD: CPT | Performed by: OBSTETRICS & GYNECOLOGY

## 2022-08-04 PROCEDURE — 3078F DIAST BP <80 MM HG: CPT | Performed by: OBSTETRICS & GYNECOLOGY

## 2022-08-04 PROCEDURE — 99396 PREV VISIT EST AGE 40-64: CPT | Performed by: OBSTETRICS & GYNECOLOGY

## 2022-08-04 PROCEDURE — 3074F SYST BP LT 130 MM HG: CPT | Performed by: OBSTETRICS & GYNECOLOGY

## 2022-08-08 DIAGNOSIS — E03.9 HYPOTHYROIDISM, UNSPECIFIED TYPE: ICD-10-CM

## 2022-08-08 RX ORDER — LEVOTHYROXINE SODIUM 88 MCG
88 TABLET ORAL
Qty: 60 TABLET | Refills: 0 | Status: SHIPPED | OUTPATIENT
Start: 2022-08-08

## 2022-09-02 ENCOUNTER — OFFICE VISIT (OUTPATIENT)
Facility: LOCATION | Age: 57
End: 2022-09-02
Payer: COMMERCIAL

## 2022-09-02 VITALS — TEMPERATURE: 97 F | HEART RATE: 62 BPM

## 2022-09-02 DIAGNOSIS — I83.812 VARICOSE VEINS OF LEG WITH PAIN, LEFT: Primary | ICD-10-CM

## 2022-09-02 NOTE — PROCEDURES
Pre op diagnosis: Varicose veins with pain, left anterior thigh and medial left lower leg    Post op diagnosis: Varicose veins with pain, left anterior thigh and medial left lower leg    Procedure: Sclerotherapy of varicose veins, left anterior thigh and medial left lower leg    Surgeon: Idania Reyes MD    History of present illness: This patient was found to have symptomatic varicose veins on clinical exam. The varicose veins are determined to be amenable to sclerotherapy treatment. The patient presents at this time for sclerotherapy. 4 cc of sclerosant was used. Operative findings:   The patient had sclerotherapy injections into multiple veins including spider veins, reticular veins, and medium size varicosities. Operative Summary:   Each location was treated in the same fashion. The site was prepped with alcohol. The vein was cannulated and was then injected with a sclerotherapy agent. Great care was taken to avoid extravasation of fluid from the veins. The procedure was performed repeatedly with several different sites injected by the sclerotherapy agent. Sterile dressings were placed at the puncture sites after the procedure. The patient was informed to place the compression stockings immediately after the procedure was completed. The patient remained in stable condition after the procedure and was observed in our office after the procedure for an appropriate period of time.     EBL: none    Idania Reyes MD

## 2022-09-27 ENCOUNTER — TELEPHONE (OUTPATIENT)
Facility: LOCATION | Age: 57
End: 2022-09-27

## 2022-10-02 DIAGNOSIS — E03.9 HYPOTHYROIDISM, UNSPECIFIED TYPE: ICD-10-CM

## 2022-10-04 ENCOUNTER — TELEPHONE (OUTPATIENT)
Dept: FAMILY MEDICINE CLINIC | Facility: CLINIC | Age: 57
End: 2022-10-04

## 2022-10-04 RX ORDER — LEVOTHYROXINE SODIUM 88 MCG
TABLET ORAL
Qty: 30 TABLET | Refills: 0 | Status: SHIPPED | OUTPATIENT
Start: 2022-10-04

## 2022-10-04 NOTE — TELEPHONE ENCOUNTER
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 Requesting a refill on the \" SYNTHROID 88 MCG Oral Tab\"      Pt will be out in 2 days. Pharmacy told her to call the office.

## 2022-10-04 NOTE — TELEPHONE ENCOUNTER
See refill 10/2/22. Noted  SYNTHROID 88 MCG Oral Tab 30 tablet 0 10/4/2022     Sig: TAKE 1 TABLET BY MOUTH BEFORE BREAKFAST    Sent to pharmacy as: Synthroid 88 MCG Oral Tablet    Notes to Pharmacy: WILL GET FULL RX AT UPCOMING APPT.     E-Prescribing Status: Receipt confirmed by pharmacy (10/4/2022 10:31 AM CDT)

## 2022-10-05 ENCOUNTER — OFFICE VISIT (OUTPATIENT)
Facility: LOCATION | Age: 57
End: 2022-10-05
Payer: COMMERCIAL

## 2022-10-05 VITALS — HEART RATE: 62 BPM | TEMPERATURE: 97 F

## 2022-10-05 DIAGNOSIS — I83.813 VARICOSE VEINS OF BILATERAL LOWER EXTREMITIES WITH PAIN: Primary | ICD-10-CM

## 2022-10-05 PROCEDURE — 99212 OFFICE O/P EST SF 10 MIN: CPT | Performed by: SURGERY

## 2022-10-12 ENCOUNTER — TELEPHONE (OUTPATIENT)
Dept: FAMILY MEDICINE CLINIC | Facility: CLINIC | Age: 57
End: 2022-10-12

## 2022-10-12 NOTE — TELEPHONE ENCOUNTER
Called patient who states on Monday she felt like she had to keep clearing her throat. Yesterday at work around 10 am she developed a bad HA and back pain which she never has. Felt worse as the day progressed. Had a fever of 102 when she got home from work. Bought a home covid test and it was positive. Now has a lot of nasal congestion, cough and body aches. Feels like she has to cough out phlegm, but nothing comes up. Is able to take a deep breath without coughing. Can't sleep because her head/sinuses are so congested at night. Has been taking advil, tylenol ES and sudafed. Advised to push fluids, warm tea w honey/lemon, steam tx, antihistamine like claritin instead of Sudafed, flonase nasal spray, Delsym DM cough syrup. Try benadryl at HS with head propped up and humidifier. Continue ES tylenol or alternate advil with ES tylenol. If she develops any SOB, CP/tightness, wheezing or breathing difficulty, go to IC for evaluation. If symptoms are not improving can call for Video Visit, declines at this time. Reviewed self isolation and masking guidelines.

## 2022-10-12 NOTE — TELEPHONE ENCOUNTER
Tested positive for covid yesterday. Has coughing and congestion. Has fever will take temperature now. Taking tylenol and sudafed.  Please triage

## 2022-10-13 ENCOUNTER — TELEMEDICINE (OUTPATIENT)
Dept: FAMILY MEDICINE CLINIC | Facility: CLINIC | Age: 57
End: 2022-10-13

## 2022-10-13 ENCOUNTER — PATIENT MESSAGE (OUTPATIENT)
Dept: FAMILY MEDICINE CLINIC | Facility: CLINIC | Age: 57
End: 2022-10-13

## 2022-10-13 DIAGNOSIS — J06.9 UPPER RESPIRATORY TRACT INFECTION DUE TO COVID-19 VIRUS: Primary | ICD-10-CM

## 2022-10-13 DIAGNOSIS — U07.1 UPPER RESPIRATORY TRACT INFECTION DUE TO COVID-19 VIRUS: Primary | ICD-10-CM

## 2022-10-13 PROCEDURE — 99213 OFFICE O/P EST LOW 20 MIN: CPT | Performed by: FAMILY MEDICINE

## 2022-10-13 RX ORDER — BENZONATATE 200 MG/1
200 CAPSULE ORAL 3 TIMES DAILY PRN
Qty: 30 CAPSULE | Refills: 0 | Status: SHIPPED | OUTPATIENT
Start: 2022-10-13

## 2022-10-13 NOTE — TELEPHONE ENCOUNTER
From: Frederica Rubinstein  To: Ten Moreno MD  Sent: 10/13/2022 7:07 AM CDT  Subject: Ellie Falling Dr. Faustina Jeffries,     I had called yesterday because I took rapid test positive. The nurse called me back which I very much appreciate but woke up this morning with 102 - 103 fever and definitely headache. Took 2 Tylenol at 5.30 and because my head is so stuffed up took Sudafed at 6.30. I am really stuffed up and not comfortable at night. Like never been like this. I need to take something to get out of head during day then something to make me comfortable at night. Hate trying to figure out what to take! I cough every once in a while not getting anything out. Breathing is fine right now. Then I hear about this plaxlovid? Is it a miracle pill? Fabricio Sorry for long message. please tell me what I can take during day to get out of head, then night to be comfortable. Worry about mixing meds. Thank you.

## 2022-10-13 NOTE — TELEPHONE ENCOUNTER
Dr. Te Fam,  Please advise    Patient is asking about Paxlovid. You have no open appts today. Can you accommodate or schedule with Dr. Laureen Cisneros?

## 2022-10-13 NOTE — TELEPHONE ENCOUNTER
[facility] requesting return call to schedule Video Visit this afternoon with Dr. Dread Bangura to discuss covid symptoms and paxlovid.

## 2022-10-21 ENCOUNTER — OFFICE VISIT (OUTPATIENT)
Dept: FAMILY MEDICINE CLINIC | Facility: CLINIC | Age: 57
End: 2022-10-21
Payer: COMMERCIAL

## 2022-10-21 ENCOUNTER — LAB ENCOUNTER (OUTPATIENT)
Dept: LAB | Age: 57
End: 2022-10-21
Attending: FAMILY MEDICINE
Payer: COMMERCIAL

## 2022-10-21 VITALS
HEIGHT: 65 IN | OXYGEN SATURATION: 98 % | TEMPERATURE: 97 F | BODY MASS INDEX: 20.83 KG/M2 | HEART RATE: 73 BPM | SYSTOLIC BLOOD PRESSURE: 120 MMHG | DIASTOLIC BLOOD PRESSURE: 60 MMHG | RESPIRATION RATE: 18 BRPM | WEIGHT: 125 LBS

## 2022-10-21 DIAGNOSIS — G25.81 RLS (RESTLESS LEGS SYNDROME): ICD-10-CM

## 2022-10-21 DIAGNOSIS — Z00.00 ROUTINE GENERAL MEDICAL EXAMINATION AT A HEALTH CARE FACILITY: Primary | ICD-10-CM

## 2022-10-21 DIAGNOSIS — Z00.00 ROUTINE GENERAL MEDICAL EXAMINATION AT A HEALTH CARE FACILITY: ICD-10-CM

## 2022-10-21 DIAGNOSIS — H61.23 IMPACTED CERUMEN OF BOTH EARS: ICD-10-CM

## 2022-10-21 DIAGNOSIS — Z12.83 SCREENING FOR SKIN CANCER: ICD-10-CM

## 2022-10-21 DIAGNOSIS — E55.9 VITAMIN D DEFICIENCY: ICD-10-CM

## 2022-10-21 DIAGNOSIS — E03.9 HYPOTHYROIDISM, UNSPECIFIED TYPE: ICD-10-CM

## 2022-10-21 DIAGNOSIS — Z23 NEED FOR VACCINATION: ICD-10-CM

## 2022-10-21 DIAGNOSIS — R09.82 POST-NASAL DRAINAGE: ICD-10-CM

## 2022-10-21 DIAGNOSIS — G57.01 PYRIFORMIS SYNDROME, RIGHT: ICD-10-CM

## 2022-10-21 PROBLEM — Z01.419 WELL WOMAN EXAM WITH ROUTINE GYNECOLOGICAL EXAM: Status: RESOLVED | Noted: 2018-11-12 | Resolved: 2022-10-21

## 2022-10-21 LAB
ALBUMIN SERPL-MCNC: 3.8 G/DL (ref 3.4–5)
ALBUMIN/GLOB SERPL: 1.2 {RATIO} (ref 1–2)
ALP LIVER SERPL-CCNC: 82 U/L
ALT SERPL-CCNC: 50 U/L
ANION GAP SERPL CALC-SCNC: 7 MMOL/L (ref 0–18)
AST SERPL-CCNC: 31 U/L (ref 15–37)
BASOPHILS # BLD AUTO: 0.06 X10(3) UL (ref 0–0.2)
BASOPHILS NFR BLD AUTO: 1.1 %
BILIRUB SERPL-MCNC: 0.4 MG/DL (ref 0.1–2)
BUN BLD-MCNC: 7 MG/DL (ref 7–18)
BUN/CREAT SERPL: 8.3 (ref 10–20)
CALCIUM BLD-MCNC: 9 MG/DL (ref 8.5–10.1)
CHLORIDE SERPL-SCNC: 107 MMOL/L (ref 98–112)
CHOLEST SERPL-MCNC: 186 MG/DL (ref ?–200)
CO2 SERPL-SCNC: 27 MMOL/L (ref 21–32)
CREAT BLD-MCNC: 0.84 MG/DL
DEPRECATED RDW RBC AUTO: 40.5 FL (ref 35.1–46.3)
EOSINOPHIL # BLD AUTO: 0.17 X10(3) UL (ref 0–0.7)
EOSINOPHIL NFR BLD AUTO: 3.1 %
ERYTHROCYTE [DISTWIDTH] IN BLOOD BY AUTOMATED COUNT: 11.8 % (ref 11–15)
FASTING PATIENT LIPID ANSWER: NO
FASTING STATUS PATIENT QL REPORTED: NO
GFR SERPLBLD BASED ON 1.73 SQ M-ARVRAT: 82 ML/MIN/1.73M2 (ref 60–?)
GLOBULIN PLAS-MCNC: 3.1 G/DL (ref 2.8–4.4)
GLUCOSE BLD-MCNC: 120 MG/DL (ref 70–99)
HCT VFR BLD AUTO: 42 %
HDLC SERPL-MCNC: 68 MG/DL (ref 40–59)
HGB BLD-MCNC: 13.8 G/DL
IMM GRANULOCYTES # BLD AUTO: 0.02 X10(3) UL (ref 0–1)
IMM GRANULOCYTES NFR BLD: 0.4 %
IRON SATN MFR SERPL: 33 %
IRON SERPL-MCNC: 107 UG/DL
LDLC SERPL CALC-MCNC: 99 MG/DL (ref ?–100)
LYMPHOCYTES # BLD AUTO: 1.56 X10(3) UL (ref 1–4)
LYMPHOCYTES NFR BLD AUTO: 28.2 %
MCH RBC QN AUTO: 31.1 PG (ref 26–34)
MCHC RBC AUTO-ENTMCNC: 32.9 G/DL (ref 31–37)
MCV RBC AUTO: 94.6 FL
MONOCYTES # BLD AUTO: 0.2 X10(3) UL (ref 0.1–1)
MONOCYTES NFR BLD AUTO: 3.6 %
NEUTROPHILS # BLD AUTO: 3.52 X10 (3) UL (ref 1.5–7.7)
NEUTROPHILS # BLD AUTO: 3.52 X10(3) UL (ref 1.5–7.7)
NEUTROPHILS NFR BLD AUTO: 63.6 %
NONHDLC SERPL-MCNC: 118 MG/DL (ref ?–130)
OSMOLALITY SERPL CALC.SUM OF ELEC: 291 MOSM/KG (ref 275–295)
PLATELET # BLD AUTO: 342 10(3)UL (ref 150–450)
POTASSIUM SERPL-SCNC: 4.1 MMOL/L (ref 3.5–5.1)
PROT SERPL-MCNC: 6.9 G/DL (ref 6.4–8.2)
RBC # BLD AUTO: 4.44 X10(6)UL
SODIUM SERPL-SCNC: 141 MMOL/L (ref 136–145)
T4 FREE SERPL-MCNC: 1.2 NG/DL (ref 0.8–1.7)
TIBC SERPL-MCNC: 320 UG/DL (ref 240–450)
TRANSFERRIN SERPL-MCNC: 215 MG/DL (ref 200–360)
TRIGL SERPL-MCNC: 108 MG/DL (ref 30–149)
TSI SER-ACNC: 2.58 MIU/ML (ref 0.36–3.74)
VIT B12 SERPL-MCNC: 847 PG/ML (ref 193–986)
VIT D+METAB SERPL-MCNC: 32.6 NG/ML (ref 30–100)
VLDLC SERPL CALC-MCNC: 18 MG/DL (ref 0–30)
WBC # BLD AUTO: 5.5 X10(3) UL (ref 4–11)

## 2022-10-21 PROCEDURE — 83540 ASSAY OF IRON: CPT | Performed by: FAMILY MEDICINE

## 2022-10-21 PROCEDURE — 3078F DIAST BP <80 MM HG: CPT | Performed by: FAMILY MEDICINE

## 2022-10-21 PROCEDURE — 90686 IIV4 VACC NO PRSV 0.5 ML IM: CPT | Performed by: FAMILY MEDICINE

## 2022-10-21 PROCEDURE — 82306 VITAMIN D 25 HYDROXY: CPT | Performed by: FAMILY MEDICINE

## 2022-10-21 PROCEDURE — 90471 IMMUNIZATION ADMIN: CPT | Performed by: FAMILY MEDICINE

## 2022-10-21 PROCEDURE — 3008F BODY MASS INDEX DOCD: CPT | Performed by: FAMILY MEDICINE

## 2022-10-21 PROCEDURE — 82607 VITAMIN B-12: CPT | Performed by: FAMILY MEDICINE

## 2022-10-21 PROCEDURE — 80061 LIPID PANEL: CPT | Performed by: FAMILY MEDICINE

## 2022-10-21 PROCEDURE — 84466 ASSAY OF TRANSFERRIN: CPT | Performed by: FAMILY MEDICINE

## 2022-10-21 PROCEDURE — 99214 OFFICE O/P EST MOD 30 MIN: CPT | Performed by: FAMILY MEDICINE

## 2022-10-21 PROCEDURE — 3074F SYST BP LT 130 MM HG: CPT | Performed by: FAMILY MEDICINE

## 2022-10-21 PROCEDURE — 99396 PREV VISIT EST AGE 40-64: CPT | Performed by: FAMILY MEDICINE

## 2022-10-21 PROCEDURE — 84439 ASSAY OF FREE THYROXINE: CPT | Performed by: FAMILY MEDICINE

## 2022-10-21 PROCEDURE — 80050 GENERAL HEALTH PANEL: CPT | Performed by: FAMILY MEDICINE

## 2022-10-21 RX ORDER — ROPINIROLE 0.25 MG/1
0.25 TABLET, FILM COATED ORAL NIGHTLY
Qty: 30 TABLET | Refills: 0 | Status: SHIPPED | OUTPATIENT
Start: 2022-10-21 | End: 2022-11-20

## 2022-10-26 ENCOUNTER — TELEPHONE (OUTPATIENT)
Dept: FAMILY MEDICINE CLINIC | Facility: CLINIC | Age: 57
End: 2022-10-26

## 2022-10-26 NOTE — TELEPHONE ENCOUNTER
Patient came into office and dropped off form for work that needs to be completed by PCP. Had annual pe last week with . Please advise. Pt also stating she is waiting on refills for medications? Looks like one was partially sent before appointment.        Placing form in bin by triage

## 2022-10-27 ENCOUNTER — TELEPHONE (OUTPATIENT)
Dept: FAMILY MEDICINE CLINIC | Facility: CLINIC | Age: 57
End: 2022-10-27

## 2022-10-27 ENCOUNTER — MED REC SCAN ONLY (OUTPATIENT)
Dept: FAMILY MEDICINE CLINIC | Facility: CLINIC | Age: 57
End: 2022-10-27

## 2022-10-28 ENCOUNTER — TELEPHONE (OUTPATIENT)
Dept: FAMILY MEDICINE CLINIC | Facility: CLINIC | Age: 57
End: 2022-10-28

## 2022-10-28 DIAGNOSIS — E03.9 HYPOTHYROIDISM, UNSPECIFIED TYPE: ICD-10-CM

## 2022-10-28 NOTE — TELEPHONE ENCOUNTER
Patient needs a refill on her medication Synthroid 88mg.     Last office visit: (if over 1 year, schedule appointment)10/22    Appointment scheduled with:Dr Shook    Requested medication: Synthroid 88mg    Pharmacy:[id number]  [address] [place]

## 2022-10-30 RX ORDER — LEVOTHYROXINE SODIUM 88 MCG
88 TABLET ORAL
Qty: 90 TABLET | Refills: 3 | Status: SHIPPED | OUTPATIENT
Start: 2022-10-30

## 2022-11-22 DIAGNOSIS — G25.81 RLS (RESTLESS LEGS SYNDROME): ICD-10-CM

## 2022-11-22 RX ORDER — ROPINIROLE 0.25 MG/1
0.25 TABLET, FILM COATED ORAL NIGHTLY
Qty: 30 TABLET | Refills: 0 | Status: SHIPPED | OUTPATIENT
Start: 2022-11-22 | End: 2022-12-22

## 2022-11-30 ENCOUNTER — MED REC SCAN ONLY (OUTPATIENT)
Dept: FAMILY MEDICINE CLINIC | Facility: CLINIC | Age: 57
End: 2022-11-30

## 2022-12-14 ENCOUNTER — OFFICE VISIT (OUTPATIENT)
Facility: LOCATION | Age: 57
End: 2022-12-14
Payer: COMMERCIAL

## 2022-12-14 VITALS — HEART RATE: 75 BPM | TEMPERATURE: 98 F

## 2022-12-14 DIAGNOSIS — I83.813 VARICOSE VEINS OF BILATERAL LOWER EXTREMITIES WITH PAIN: Primary | ICD-10-CM

## 2022-12-14 PROCEDURE — 36471 NJX SCLRSNT MLT INCMPTNT VN: CPT | Performed by: SURGERY

## 2022-12-14 NOTE — PROCEDURES
Pre op diagnosis: Varicose veins with pain, left lower leg    Post op diagnosis: Varicose veins with pain, left lower leg    Procedure: Sclerotherapy of varicose veins with pain, left lower leg    Surgeon: Dahlia Atwood MD    History of present illness: This patient was found to have symptomatic varicose veins on clinical exam. The varicose veins are determined to be amenable to sclerotherapy treatment. The patient presents at this time for sclerotherapy. 4 cc of sclerosant was used. Operative findings:   The patient had sclerotherapy injections into multiple veins including spider veins, reticular veins, and medium size varicosities. Operative Summary:   Each location was treated in the same fashion. The site was prepped with alcohol. The vein was cannulated and was then injected with a sclerotherapy agent. Great care was taken to avoid extravasation of fluid from the veins. The procedure was performed repeatedly with several different sites injected by the sclerotherapy agent. Sterile dressings were placed at the puncture sites after the procedure. The patient was informed to place the compression stockings immediately after the procedure was completed. The patient remained in stable condition after the procedure and was observed in our office after the procedure for an appropriate period of time.     EBL: none    Dahlia Atwood MD

## 2022-12-26 DIAGNOSIS — G25.81 RLS (RESTLESS LEGS SYNDROME): ICD-10-CM

## 2022-12-29 RX ORDER — ROPINIROLE 0.25 MG/1
0.25 TABLET, FILM COATED ORAL NIGHTLY
Qty: 30 TABLET | Refills: 0 | Status: SHIPPED | OUTPATIENT
Start: 2022-12-29 | End: 2023-01-28

## 2023-08-07 ENCOUNTER — OFFICE VISIT (OUTPATIENT)
Facility: CLINIC | Age: 58
End: 2023-08-07
Payer: COMMERCIAL

## 2023-08-07 VITALS
WEIGHT: 126 LBS | BODY MASS INDEX: 20.99 KG/M2 | SYSTOLIC BLOOD PRESSURE: 100 MMHG | HEIGHT: 65 IN | HEART RATE: 70 BPM | DIASTOLIC BLOOD PRESSURE: 60 MMHG

## 2023-08-07 DIAGNOSIS — Z12.4 PAPANICOLAOU SMEAR FOR CERVICAL CANCER SCREENING: ICD-10-CM

## 2023-08-07 DIAGNOSIS — Z12.31 ENCOUNTER FOR SCREENING MAMMOGRAM FOR BREAST CANCER: ICD-10-CM

## 2023-08-07 DIAGNOSIS — Z01.419 WELL WOMAN EXAM WITH ROUTINE GYNECOLOGICAL EXAM: Primary | ICD-10-CM

## 2023-08-07 RX ORDER — COVID-19 ANTIGEN TEST
KIT MISCELLANEOUS
COMMUNITY
Start: 2023-04-28

## 2023-08-07 NOTE — PROGRESS NOTES
Jaswant Vasquez is a 65year old female  No LMP recorded (lmp unknown). Patient is postmenopausal. Patient presents with:  Physical  .     Have any vaginal bleeding. She is done a colonoscopy and was good for 10 years. Blood work is done with her primary. Vitamin D encouraged. She has pain with intercourse dryness. Does not want prescription medicine at this time.     OBSTETRICS HISTORY:  OB History    Para Term  AB Living   3 3 2 1   3   SAB IAB Ectopic Multiple Live Births           3      # Outcome Date GA Lbr David/2nd Weight Sex Delivery Anes PTL Lv   3 Term 10/04/01 37w0d   F Caesarean      2 Term 95 37w0d   M Caesarean      1  93 36w0d   F Caesarean          GYNE HISTORY:  Periods none due to menopause      Sexual activity:   Yes      Partners:   Male        Hx Prior Abnormal Pap: Yes (bx 20s)  Pap Date: 21  Pap Result Notes: wnl        MEDICAL HISTORY:  Past Medical History:   Diagnosis Date    Acute maxillary sinusitis     Food intolerance     Frequent use of laxatives     H/O mammogram 10/16,10/15,,    negative    H/O screening mammography 10/29/2018    Negative-[facility]    History of endometrial biopsy 2015    benign endometrial polyp    Leukocytopenia, unspecified     Pap smear for cervical cancer screening 2015    negative    Uncomfortable fullness after meals     Unspecified hypothyroidism        SURGICAL HISTORY:  Past Surgical History:   Procedure Laterality Date      10/4/2001, 3/24/1995, 3/9/1993    x 3    Other surgical history  2022    varicose vein removed       SOCIAL HISTORY:  Social History    Socioeconomic History      Marital status:       Spouse name: Not on file      Number of children: Not on file      Years of education: Not on file      Highest education level: Not on file    Occupational History      Not on file    Tobacco Use      Smoking status: Never      Smokeless tobacco: Never    Vaping Use Vaping Use: Never used    Substance and Sexual Activity      Alcohol use: Yes      Drug use: No      Sexual activity: Yes        Partners: Male    Other Topics      Concerns:         Service: Not Asked        Blood Transfusions: Not Asked        Caffeine Concern: No        Occupational Exposure: Not Asked        Hobby Hazards: Not Asked        Sleep Concern: Not Asked        Stress Concern: Not Asked        Weight Concern: Not Asked        Special Diet: Not Asked        Back Care: Not Asked        Exercise: Yes        Bike Helmet: Not Asked        Seat Belt: Yes        Self-Exams: Not Asked    Social History Narrative      Not on file    Social Determinants of Health  Financial Resource Strain: Not on file  Food Insecurity: Not on file  Transportation Needs: Not on file  Physical Activity: Not on file  Stress: Not on file  Social Connections: Not on file  Housing Stability: Not on file    FAMILY HISTORY:  Family History   Problem Relation Age of Onset    Hypertension Father     Lipids Father         hyperlipidemia    Prostate Cancer Father 72    Colon Polyps Father     Colon Polyps Mother     Cancer Mother         lung cancer    No Known Problems Daughter     No Known Problems Daughter     Allergies Son     Breast Cancer Maternal Grandmother 79    Breast Cancer 2nd occurrence Maternal Grandmother         dx age [de-identified]    Cancer Maternal Grandfather         mesothelioma dx age 66's    No Known Problems Paternal Grandmother     No Known Problems Paternal Grandfather     Uterine Cancer Neg     Ovarian Cancer Neg        MEDICATIONS:    Current Outpatient Medications:     FLOWFLEX COVID-19 AG HOME TEST In Vitro Kit, ADMINISTER AS PER PROTOCOL, Disp: , Rfl:     SYNTHROID 88 MCG Oral Tab, Take 1 tablet (88 mcg total) by mouth before breakfast., Disp: 90 tablet, Rfl: 3    ALLERGIES:    Biaxin [Clarithromy*    PAIN    Comment:Abdominal pain  Morphine Sulfate        NAUSEA AND VOMITING      Review of Systems:  Constitutional:  Denies fatigue, night sweats, hot flashes  Gastrointestinal:  denies heartburn, abdominal pain, diarrhea or constipation  Genitourinary:  denies dysuria, incontinence, abnormal vaginal discharge, vaginal itching  Skin/Breast:  Denies any breast pain, lumps, or discharge. Neurological:  denies headaches, extremity weakness or numbness. PHYSICAL EXAM:   Constitutional: well developed, well nourished  Head/Face: normocephalic  Neck/Thyroid: thyroid symmetric, no thyromegaly, no nodules, no adenopathy  Breast: normal without palpable masses, tenderness, asymmetry, nipple discharge, nipple retraction or skin changes  Abdomen:  soft, nontender, nondistended, no masses  Skin/Hair: no unusual rashes or bruises   Extremities: no edema, no cyanosis  Psychiatric:  Oriented to time, place, person and situation. Appropriate mood and affect    Pelvic Exam:  External Genitalia: normal appearance, hair distribution, and no lesions  Urethral Meatus:  normal in size, location, without lesions and prolapse  Bladder:  No fullness, masses or tenderness  Vagina:  Normal appearance without lesions, no abnormal discharge  Cervix:  Normal without tenderness on motion without lesions Pap. Uterus: normal in size, contour, position, mobility, without tenderness  Adnexa: normal without masses or tenderness  Perineum: normal  Anus: no hemorroids     Assessment & Plan:  Sharee Patton was seen today for physical.    Diagnoses and all orders for this visit:    Well woman exam with routine gynecological exam        Ramakrishna Calzada.

## 2023-08-23 LAB — LAST PAP RESULT: NORMAL

## 2023-10-03 ENCOUNTER — IMMUNIZATION (OUTPATIENT)
Dept: FAMILY MEDICINE CLINIC | Facility: CLINIC | Age: 58
End: 2023-10-03
Payer: COMMERCIAL

## 2023-10-03 DIAGNOSIS — Z23 NEED FOR VACCINATION: Primary | ICD-10-CM

## 2023-10-03 PROCEDURE — 90471 IMMUNIZATION ADMIN: CPT | Performed by: FAMILY MEDICINE

## 2023-10-03 PROCEDURE — 90686 IIV4 VACC NO PRSV 0.5 ML IM: CPT | Performed by: FAMILY MEDICINE

## 2023-10-18 ENCOUNTER — TELEPHONE (OUTPATIENT)
Dept: FAMILY MEDICINE CLINIC | Facility: CLINIC | Age: 58
End: 2023-10-18

## 2023-10-18 ENCOUNTER — OFFICE VISIT (OUTPATIENT)
Dept: FAMILY MEDICINE CLINIC | Facility: CLINIC | Age: 58
End: 2023-10-18

## 2023-10-18 VITALS
RESPIRATION RATE: 18 BRPM | SYSTOLIC BLOOD PRESSURE: 118 MMHG | WEIGHT: 126 LBS | DIASTOLIC BLOOD PRESSURE: 78 MMHG | TEMPERATURE: 98 F | HEART RATE: 80 BPM | BODY MASS INDEX: 20.99 KG/M2 | HEIGHT: 65 IN | OXYGEN SATURATION: 98 %

## 2023-10-18 DIAGNOSIS — H81.11 BENIGN PAROXYSMAL POSITIONAL VERTIGO OF RIGHT EAR: ICD-10-CM

## 2023-10-18 DIAGNOSIS — K04.7 DENTAL ABSCESS: Primary | ICD-10-CM

## 2023-10-18 PROCEDURE — 3074F SYST BP LT 130 MM HG: CPT | Performed by: FAMILY MEDICINE

## 2023-10-18 PROCEDURE — 3078F DIAST BP <80 MM HG: CPT | Performed by: FAMILY MEDICINE

## 2023-10-18 PROCEDURE — 99213 OFFICE O/P EST LOW 20 MIN: CPT | Performed by: FAMILY MEDICINE

## 2023-10-18 PROCEDURE — 3008F BODY MASS INDEX DOCD: CPT | Performed by: FAMILY MEDICINE

## 2023-10-18 RX ORDER — AMOXICILLIN 500 MG/1
500 CAPSULE ORAL 3 TIMES DAILY
Qty: 21 CAPSULE | Refills: 0 | Status: SHIPPED | OUTPATIENT
Start: 2023-10-18 | End: 2023-10-25

## 2023-10-18 NOTE — TELEPHONE ENCOUNTER
Pt c/o sinus pressure and nasal drainage, swelling and pressure in face near sinus area x's 1 week, denies fever

## 2023-10-18 NOTE — TELEPHONE ENCOUNTER
Returned call to patient who states for the past week she has been having sinus pressure, pain on left side around teeth and feels a lump under her left jaw. Does have a little runny nose with PND. Has clear nasal drainage. Has been taking sudafed. Denies sore throat, cough or fever. Has not tried any OTC antihistamine like claritin, allegra or zyrtec or any nasal spray. Is able to eat and drink without difficulty. Asking for antibiotic. Is going out of town on Friday and needs symptoms to go away. States has had similar symptoms and was given abx. Advised will need appt for evaluation to determine appropriate tx.     Future Appointments   Date Time Provider Ji Cordon   10/18/2023  3:00 PM Eunice Keith MD [facility]   10/25/2023 11:00 AM Eunice Keith MD [facility]

## 2023-10-22 DIAGNOSIS — E03.9 HYPOTHYROIDISM, UNSPECIFIED TYPE: ICD-10-CM

## 2023-10-23 RX ORDER — LEVOTHYROXINE SODIUM 88 MCG
88 TABLET ORAL
Qty: 90 TABLET | Refills: 0 | Status: SHIPPED | OUTPATIENT
Start: 2023-10-23

## 2023-10-25 ENCOUNTER — LAB ENCOUNTER (OUTPATIENT)
Dept: LAB | Age: 58
End: 2023-10-25
Attending: FAMILY MEDICINE

## 2023-10-25 ENCOUNTER — OFFICE VISIT (OUTPATIENT)
Dept: FAMILY MEDICINE CLINIC | Facility: CLINIC | Age: 58
End: 2023-10-25

## 2023-10-25 VITALS
HEIGHT: 65 IN | TEMPERATURE: 98 F | RESPIRATION RATE: 18 BRPM | OXYGEN SATURATION: 98 % | SYSTOLIC BLOOD PRESSURE: 118 MMHG | WEIGHT: 126 LBS | HEART RATE: 72 BPM | DIASTOLIC BLOOD PRESSURE: 76 MMHG | BODY MASS INDEX: 20.99 KG/M2

## 2023-10-25 DIAGNOSIS — R20.0 NUMBNESS OF TOES: ICD-10-CM

## 2023-10-25 DIAGNOSIS — R73.01 IMPAIRED FASTING GLUCOSE: ICD-10-CM

## 2023-10-25 DIAGNOSIS — H81.10 BENIGN PAROXYSMAL POSITIONAL VERTIGO, UNSPECIFIED LATERALITY: ICD-10-CM

## 2023-10-25 DIAGNOSIS — E03.9 HYPOTHYROIDISM, UNSPECIFIED TYPE: ICD-10-CM

## 2023-10-25 DIAGNOSIS — Z00.00 ROUTINE GENERAL MEDICAL EXAMINATION AT A HEALTH CARE FACILITY: ICD-10-CM

## 2023-10-25 DIAGNOSIS — L98.9 SKIN LESION: ICD-10-CM

## 2023-10-25 DIAGNOSIS — Z00.00 ROUTINE GENERAL MEDICAL EXAMINATION AT A HEALTH CARE FACILITY: Primary | ICD-10-CM

## 2023-10-25 DIAGNOSIS — E55.9 VITAMIN D DEFICIENCY: ICD-10-CM

## 2023-10-25 LAB
ALBUMIN SERPL-MCNC: 3.5 G/DL (ref 3.4–5)
ALBUMIN/GLOB SERPL: 1 {RATIO} (ref 1–2)
ALP LIVER SERPL-CCNC: 88 U/L
ALT SERPL-CCNC: 42 U/L
ANION GAP SERPL CALC-SCNC: 3 MMOL/L (ref 0–18)
AST SERPL-CCNC: 27 U/L (ref 15–37)
BASOPHILS # BLD AUTO: 0.07 X10(3) UL (ref 0–0.2)
BASOPHILS NFR BLD AUTO: 1.5 %
BILIRUB SERPL-MCNC: 0.5 MG/DL (ref 0.1–2)
BUN BLD-MCNC: 15 MG/DL (ref 7–18)
CALCIUM BLD-MCNC: 9.2 MG/DL (ref 8.5–10.1)
CHLORIDE SERPL-SCNC: 107 MMOL/L (ref 98–112)
CHOLEST SERPL-MCNC: 202 MG/DL (ref ?–200)
CO2 SERPL-SCNC: 28 MMOL/L (ref 21–32)
CREAT BLD-MCNC: 0.85 MG/DL
EGFRCR SERPLBLD CKD-EPI 2021: 80 ML/MIN/1.73M2 (ref 60–?)
EOSINOPHIL # BLD AUTO: 0.09 X10(3) UL (ref 0–0.7)
EOSINOPHIL NFR BLD AUTO: 1.9 %
ERYTHROCYTE [DISTWIDTH] IN BLOOD BY AUTOMATED COUNT: 12 %
EST. AVERAGE GLUCOSE BLD GHB EST-MCNC: 108 MG/DL (ref 68–126)
FASTING PATIENT LIPID ANSWER: NO
FASTING STATUS PATIENT QL REPORTED: NO
GLOBULIN PLAS-MCNC: 3.6 G/DL (ref 2.8–4.4)
GLUCOSE BLD-MCNC: 93 MG/DL (ref 70–99)
HBA1C MFR BLD: 5.4 % (ref ?–5.7)
HCT VFR BLD AUTO: 41.1 %
HDLC SERPL-MCNC: 86 MG/DL (ref 40–59)
HGB BLD-MCNC: 14.1 G/DL
IMM GRANULOCYTES # BLD AUTO: 0.01 X10(3) UL (ref 0–1)
IMM GRANULOCYTES NFR BLD: 0.2 %
LDLC SERPL CALC-MCNC: 105 MG/DL (ref ?–100)
LYMPHOCYTES # BLD AUTO: 1.49 X10(3) UL (ref 1–4)
LYMPHOCYTES NFR BLD AUTO: 31.4 %
MCH RBC QN AUTO: 32 PG (ref 26–34)
MCHC RBC AUTO-ENTMCNC: 34.3 G/DL (ref 31–37)
MCV RBC AUTO: 93.4 FL
MONOCYTES # BLD AUTO: 0.25 X10(3) UL (ref 0.1–1)
MONOCYTES NFR BLD AUTO: 5.3 %
NEUTROPHILS # BLD AUTO: 2.83 X10 (3) UL (ref 1.5–7.7)
NEUTROPHILS # BLD AUTO: 2.83 X10(3) UL (ref 1.5–7.7)
NEUTROPHILS NFR BLD AUTO: 59.7 %
NONHDLC SERPL-MCNC: 116 MG/DL (ref ?–130)
OSMOLALITY SERPL CALC.SUM OF ELEC: 287 MOSM/KG (ref 275–295)
PLATELET # BLD AUTO: 279 10(3)UL (ref 150–450)
POTASSIUM SERPL-SCNC: 4 MMOL/L (ref 3.5–5.1)
PROT SERPL-MCNC: 7.1 G/DL (ref 6.4–8.2)
RBC # BLD AUTO: 4.4 X10(6)UL
SODIUM SERPL-SCNC: 138 MMOL/L (ref 136–145)
T4 FREE SERPL-MCNC: 1.2 NG/DL (ref 0.8–1.7)
TRIGL SERPL-MCNC: 62 MG/DL (ref 30–149)
TSI SER-ACNC: 3.05 MIU/ML (ref 0.36–3.74)
VIT D+METAB SERPL-MCNC: 24.7 NG/ML (ref 30–100)
VLDLC SERPL CALC-MCNC: 10 MG/DL (ref 0–30)
WBC # BLD AUTO: 4.7 X10(3) UL (ref 4–11)

## 2023-10-25 PROCEDURE — 80061 LIPID PANEL: CPT | Performed by: FAMILY MEDICINE

## 2023-10-25 PROCEDURE — 83036 HEMOGLOBIN GLYCOSYLATED A1C: CPT | Performed by: FAMILY MEDICINE

## 2023-10-25 PROCEDURE — 84439 ASSAY OF FREE THYROXINE: CPT | Performed by: FAMILY MEDICINE

## 2023-10-25 PROCEDURE — 3074F SYST BP LT 130 MM HG: CPT | Performed by: FAMILY MEDICINE

## 2023-10-25 PROCEDURE — 82306 VITAMIN D 25 HYDROXY: CPT | Performed by: FAMILY MEDICINE

## 2023-10-25 PROCEDURE — 3008F BODY MASS INDEX DOCD: CPT | Performed by: FAMILY MEDICINE

## 2023-10-25 PROCEDURE — 99396 PREV VISIT EST AGE 40-64: CPT | Performed by: FAMILY MEDICINE

## 2023-10-25 PROCEDURE — 3078F DIAST BP <80 MM HG: CPT | Performed by: FAMILY MEDICINE

## 2023-10-25 PROCEDURE — 80050 GENERAL HEALTH PANEL: CPT | Performed by: FAMILY MEDICINE

## 2023-10-25 NOTE — PATIENT INSTRUCTIONS
[email or web address]-content/[email or web address]. pdf, follow link for exercises for vertigo.

## 2023-10-26 ENCOUNTER — PATIENT MESSAGE (OUTPATIENT)
Dept: FAMILY MEDICINE CLINIC | Facility: CLINIC | Age: 58
End: 2023-10-26

## 2023-10-27 ENCOUNTER — PATIENT MESSAGE (OUTPATIENT)
Dept: FAMILY MEDICINE CLINIC | Facility: CLINIC | Age: 58
End: 2023-10-27

## 2023-10-27 NOTE — TELEPHONE ENCOUNTER
LOV 10/25/23  PE   +6      Sidra Soulier have vitamin D deficiency, I have sent a prescription to the pharmacy, please take once a week for 3 months and then continue over the counter vitamin D 2000IU daily. Yout total cholesterol and LDL are mildly elevatedbut your HDL looks good, fasting sugar looks good and thyroid function is well controlled, ok to take one a day multivitamin Daily and continue the same dose of levothyroxine.    Written by Kobi Martin MD on 10/26/2023 10:09 PM CDT  Seen by patient Aristeo Jara on 10/27/2023  6:29 AM

## 2023-11-14 ENCOUNTER — MED REC SCAN ONLY (OUTPATIENT)
Dept: FAMILY MEDICINE CLINIC | Facility: CLINIC | Age: 58
End: 2023-11-14

## 2024-01-17 DIAGNOSIS — E03.9 HYPOTHYROIDISM, UNSPECIFIED TYPE: ICD-10-CM

## 2024-01-18 RX ORDER — LEVOTHYROXINE SODIUM 88 MCG
88 TABLET ORAL
Qty: 90 TABLET | Refills: 0 | Status: SHIPPED | OUTPATIENT
Start: 2024-01-18

## 2024-01-18 NOTE — TELEPHONE ENCOUNTER
Thyroid Supplements Protocol [id number]/17/2024 09:29 AM   Protocol Details TSH test in past 12 months    TSH value between 0.350 and 5.500 IU/ml    Appointment in past 12 or next 3 months        LOV 10/25/23      LAST LAB  10/25/23     LAST RX  10/23/23  90     Next OV   Future Appointments   Date Time Provider Department Center   8/8/2024  9:00 AM Tiffany Pedersen MD [facility]         PROTOCOL pass

## 2024-04-20 DIAGNOSIS — E03.9 HYPOTHYROIDISM, UNSPECIFIED TYPE: ICD-10-CM

## 2024-04-23 RX ORDER — LEVOTHYROXINE SODIUM 88 MCG
88 TABLET ORAL
Qty: 90 TABLET | Refills: 0 | Status: SHIPPED | OUTPATIENT
Start: 2024-04-23

## 2024-04-25 ENCOUNTER — TELEPHONE (OUTPATIENT)
Dept: FAMILY MEDICINE CLINIC | Facility: CLINIC | Age: 59
End: 2024-04-25

## 2024-04-25 NOTE — TELEPHONE ENCOUNTER
Pt calling stating her sciatica pain has been bothering her on and off for almost the last 2 months. Goes down into her legs/her legs are bothering her. Can't get comfortable or sleep well. Wondering if  can send over a muscle relaxer? Or something else to help. Said she is going out of town tomorrow and worried about this 5 hours drive ahead. Please advise

## 2024-04-25 NOTE — TELEPHONE ENCOUNTER
[phone]   Called pt stating hx of right side sciatica, fluctuates to left side as well. Tried stretches with minimal improvement. Will try warm compress. Certain movements aggravate sciatica pain- sitting or standing too long. Laying on side. But, for example, can do a squat with no problem, no pain.  No N/T of extremities. Sometimes toes feel numb. No bowel or bladder issues. No weakness.   Pt leaving out of town tomorrow afternoon. Informed may need OV for prescription.   Informed will relay to PCP for further recommendations. No further questions or concerns. Pt verbalized understanding and agreed with POC.

## 2024-05-01 ENCOUNTER — LAB ENCOUNTER (OUTPATIENT)
Dept: LAB | Age: 59
End: 2024-05-01
Attending: FAMILY MEDICINE
Payer: COMMERCIAL

## 2024-05-01 ENCOUNTER — HOSPITAL ENCOUNTER (OUTPATIENT)
Dept: GENERAL RADIOLOGY | Age: 59
Discharge: HOME | End: 2024-05-01
Attending: FAMILY MEDICINE
Payer: COMMERCIAL

## 2024-05-01 ENCOUNTER — OFFICE VISIT (OUTPATIENT)
Dept: FAMILY MEDICINE CLINIC | Facility: CLINIC | Age: 59
End: 2024-05-01
Payer: COMMERCIAL

## 2024-05-01 VITALS
DIASTOLIC BLOOD PRESSURE: 68 MMHG | OXYGEN SATURATION: 98 % | BODY MASS INDEX: 21.16 KG/M2 | WEIGHT: 127 LBS | HEIGHT: 65 IN | TEMPERATURE: 98 F | RESPIRATION RATE: 18 BRPM | SYSTOLIC BLOOD PRESSURE: 120 MMHG | HEART RATE: 80 BPM

## 2024-05-01 DIAGNOSIS — M54.31 BILATERAL SCIATICA: ICD-10-CM

## 2024-05-01 DIAGNOSIS — E55.9 VITAMIN D DEFICIENCY: ICD-10-CM

## 2024-05-01 DIAGNOSIS — M54.32 BILATERAL SCIATICA: ICD-10-CM

## 2024-05-01 DIAGNOSIS — G57.00 PIRIFORMIS SYNDROME, UNSPECIFIED LATERALITY: ICD-10-CM

## 2024-05-01 DIAGNOSIS — M54.31 BILATERAL SCIATICA: Primary | ICD-10-CM

## 2024-05-01 DIAGNOSIS — E03.9 HYPOTHYROIDISM, UNSPECIFIED TYPE: ICD-10-CM

## 2024-05-01 DIAGNOSIS — M54.32 BILATERAL SCIATICA: Primary | ICD-10-CM

## 2024-05-01 LAB — VIT D+METAB SERPL-MCNC: 36.3 NG/ML (ref 30–100)

## 2024-05-01 PROCEDURE — 3078F DIAST BP <80 MM HG: CPT | Performed by: FAMILY MEDICINE

## 2024-05-01 PROCEDURE — 99213 OFFICE O/P EST LOW 20 MIN: CPT | Performed by: FAMILY MEDICINE

## 2024-05-01 PROCEDURE — 72110 X-RAY EXAM L-2 SPINE 4/>VWS: CPT | Performed by: FAMILY MEDICINE

## 2024-05-01 PROCEDURE — 3074F SYST BP LT 130 MM HG: CPT | Performed by: FAMILY MEDICINE

## 2024-05-01 PROCEDURE — 82306 VITAMIN D 25 HYDROXY: CPT | Performed by: FAMILY MEDICINE

## 2024-05-01 PROCEDURE — 3008F BODY MASS INDEX DOCD: CPT | Performed by: FAMILY MEDICINE

## 2024-05-01 RX ORDER — LEVOTHYROXINE SODIUM 88 MCG
88 TABLET ORAL
Qty: 90 TABLET | Refills: 2 | Status: SHIPPED | OUTPATIENT
Start: 2024-05-01

## 2024-05-01 NOTE — PROGRESS NOTES
Emily Beltrán is a 58 year old female.  Chief Complaint   Patient presents with    Sciatica     Right nargis     HPI:   Emily Beltrán is a 58 year old female with history of hypothyroidism complaining of bilateral leg pain right more than left, patient states symptoms ongoing for a while but have progressively gotten worse, feels pain in the buttock area that can at times radiate down her leg, sometimes feels like her leg will give out.  Has intermittent tingling and numbness in her right foot second and third toe.  Pain tends to be worse if she has been sitting down for a while and gets up to walk.  Has been taking a couple of Advil at night to help with the pain.  Denies any trauma to the back.    ALLERGY:     Allergies   Allergen Reactions    Biaxin [Clarithromycin] PAIN     Abdominal pain    Morphine Sulfate NAUSEA AND VOMITING     MEDICATIONS:     Current Outpatient Medications   Medication Sig Dispense Refill    SYNTHROID 88 MCG Oral Tab Take 1 tablet (88 mcg total) by mouth before breakfast. 90 tablet 2    ergocalciferol 1.25 MG (76504 UT) Oral Cap Take 1 capsule (50,000 Units total) by mouth once a week. 12 capsule 0      Past Medical History:    Acute maxillary sinusitis    Arthritis    Little bit    Food intolerance    Frequent use of laxatives    H/O mammogram    negative    H/O mammogram    Negative. Breasts are compsed of scattered areas of fibroglandular density.    H/O screening mammography    Negative-[facility]    History of endometrial biopsy    benign endometrial polyp    Hypothyroidism    Leukocytopenia, unspecified    Pap smear for cervical cancer screening    negative    Uncomfortable fullness after meals    Unspecified hypothyroidism      Social History:  Social History     Socioeconomic History    Marital status:    Tobacco Use    Smoking status: Never    Smokeless tobacco: Never   Vaping Use    Vaping status: Never Used   Substance and Sexual Activity    Alcohol use: Yes     Alcohol/week:  2.0 standard drinks of alcohol     Types: 2 Standard drinks or equivalent per week     Comment: Glass wine on weekends    Drug use: No    Sexual activity: Yes     Partners: Male   Other Topics Concern    Caffeine Concern No    Stress Concern No    Weight Concern No    Special Diet No    Exercise Yes    Seat Belt Yes     Social Determinants of Health      [facility]    Social Connections    [facility]    Housing Stability        REVIEW OF SYSTEMS:   A comprehensive 10 point review of systems was completed.  Pertinent positives and negatives noted in the the HPI.    EXAM:   /68   Pulse 80   Temp 98 °F (36.7 °C) (Temporal)   Resp 18   Ht 5' 5\" (1.651 m)   Wt 127 lb (57.6 kg)   LMP  (LMP Unknown)   SpO2 98%   BMI 21.13 kg/m²   GENERAL: well developed, well nourished,in no apparent distress  NECK: supple  LUNGS: clear to auscultation  CARDIO: RRR without murmur  BACK: no tenderness to palpation over the lumbar spine, no spasm, no SI joint tenderness, + tenderness over he pyriformis bilateral, right more than left, SLR negative bilateral, normal ROM.    ASSESSMENT AND PLAN:   Emily was seen today for sciatica.    Diagnoses and all orders for this visit:    Bilateral sciatica  -     XR LUMBAR SPINE (MIN 4 VIEWS) (CPT=72110); Future  -     Physical Therapy Referral - External  -     discussed with patient to do the pyriformis stretches at home,   - can take ibuprofen as needed  -   if pain worsens or is not better with the physical therapy will do further workup and imaging.    Piriformis syndrome, unspecified laterality  -     Physical Therapy Referral - External    Vitamin D deficiency  -     Vitamin D [E]; Future    Hypothyroidism, unspecified type controlled  -     SYNTHROID 88 MCG Oral Tab; Take 1 tablet (88 mcg total) by mouth before breakfast.  -      continue the same dose  of medication.       The 21st Century Cures Act makes medical notes like these available to patients in the interest of transparency. Please be advised this is a medical document. Medical documents are intended to carry relevant information, facts as evident, and the clinical opinion of the practitioner. The medical note is intended as peer to peer communication and may appear blunt or direct. It is written in medical language and may contain abbreviations or verbiage that are unfamiliar.

## 2024-05-06 ENCOUNTER — PATIENT MESSAGE (OUTPATIENT)
Dept: FAMILY MEDICINE CLINIC | Facility: CLINIC | Age: 59
End: 2024-05-06

## 2024-05-07 NOTE — TELEPHONE ENCOUNTER
From: Emily Beltrán  To: Karen Shook  Sent: 5/6/2024 8:57 AM CDT  Subject: Test results    Hi Dr. Shook,    Just wanted to know about my test result? I see the one with vit. D and assume I am good? On low end but ok? Just keep taking my one a day? And try to eat a little bit more with vitamin D just to make sure?    Also, the x-rays? I read it, but have no idea what they’re trying to say if you could explain a little bit more Simple detail in full. There was like four they took? And curiosity and Hesitantly want to know if there is something else going on That showed up? Hopefully not but would be good to know? And do you want me still to start PT? Just wanted to make sure with the test results that that’s a go? I think I told you I ran a couple miles a day. Should I not be doing that and just walking fast? I hate to say that too because I’m addicted to doing that every morning but… Anyway, hope you are having a good day. Thank you.

## 2024-05-08 NOTE — TELEPHONE ENCOUNTER
Patient has a unilateral or bilateral fracture involving the pars interarticularis of the posterior vertebral arch ( pars defect) in bilateral L5 region, disc space narrowing in the L1-2 and L2-3 space, please refer patient to ortho spine.    Vitamin D level looks good, can continue over the counter vitamin D 2000IU daily.

## 2024-05-09 NOTE — TELEPHONE ENCOUNTER
[phone]  Lm for pt (Ok per HIPAA) to inform f/u on mc message to call back at the office to further discuss XR results more in detail.

## 2024-05-09 NOTE — TELEPHONE ENCOUNTER
Triage call transferred.   Spoke with pt to review XR results- answered all questions. Pt will see Ortho.  Pt was taking high dose vitamin D weekly. Then discussed with PCP at [facility] containing vitamin D 1000IU and indicated ok.   Based on last vitamin D labs could benefit with extra dose of vitamin D 1000 for total 2000IU daily for vitamin D maintenance.   No further questions or concerns. Pt verbalized understanding and agreed with POC.

## 2024-05-10 ENCOUNTER — OFFICE VISIT (OUTPATIENT)
Dept: ORTHOPEDICS CLINIC | Facility: CLINIC | Age: 59
End: 2024-05-10
Payer: COMMERCIAL

## 2024-05-10 DIAGNOSIS — M43.07 SPONDYLOLYSIS OF LUMBOSACRAL REGION: Primary | ICD-10-CM

## 2024-05-10 PROCEDURE — 99204 OFFICE O/P NEW MOD 45 MIN: CPT | Performed by: STUDENT IN AN ORGANIZED HEALTH CARE EDUCATION/TRAINING PROGRAM

## 2024-05-10 RX ORDER — MELOXICAM 7.5 MG/1
7.5 TABLET ORAL DAILY
Qty: 30 TABLET | Refills: 0 | Status: SHIPPED | OUTPATIENT
Start: 2024-05-10

## 2024-05-10 NOTE — H&P
[facility] - ORTHOPEDICS  [address] [place] [address], [address] [address]  [address] [place] [address]  [phone]     NEW PATIENT VISIT - HISTORY AND PHYSICAL EXAMINATION     Name: Emily Beltrán   MRN: [id number]  Date: 05/10/24       CC: back and buttock pain    REFERRED BY: Karen Shook MD    HPI:   Emily Beltrán is a very pleasant 58 year old female who presents today for evaluation of back pain. The distribution of symptoms are: 100% backpain and 0% leg pain. The symptoms began 9 month(s) ago without any significant injury. Since the onset, the symptoms have remained the same. Patient feels pain is aggravated by sitting and improved by standing, walking. The patient reports no numbness and no weakness.  The symptom characteristics are as follows: Patient is a 58-year-old female presenting with low back pain, associated with buttock pain.  Symptoms have been present since last fall.  Patient is pending formal physical therapy evaluation    Prior spine surgery: none.    Bowel and bladder symptoms: absent.    The patient has not had issues with balance and/or hand dexterity problems such as changes in penmanship or the use of buttons or zippers.    Treatment up to this time has included:    Evaluation: PCP  NSAIDS: have not been prescribed  Narcotic use: None  Physical therapy: None  Spinal injections: None  Others:       PMH:   Past Medical History:    Acute maxillary sinusitis    Arthritis    Little bit    Food intolerance    Frequent use of laxatives    H/O mammogram    negative    H/O mammogram    Negative. Breasts are compsed of scattered areas of fibroglandular density.    H/O screening mammography    Negative-[facility]    History of endometrial biopsy    benign endometrial polyp    Hypothyroidism    Leukocytopenia, unspecified    Pap smear for cervical cancer screening    negative    Uncomfortable fullness after meals    Unspecified hypothyroidism       PAST  SURGICAL HX:  Past Surgical History:   Procedure Laterality Date      10/4/2001, 3/24/1995, 3/9/1993    x 3    Colonoscopy      Other surgical history  2022    varicose vein removed       FAMILY HX:  Family History   Problem Relation Age of Onset    Hypertension Father     Lipids Father         hyperlipidemia    Prostate Cancer Father 65    Colon Polyps Father     Colon Polyps Mother     Cancer Mother         lung cancer    No Known Problems Daughter     No Known Problems Daughter     Allergies Son     Breast Cancer Maternal Grandmother 70    Breast Cancer 2nd occurrence Maternal Grandmother         dx age 80's    Cancer Maternal Grandmother         Breast cancer    Cancer Maternal Grandfather         mesothelioma dx age 70's    No Known Problems Paternal Grandmother     No Known Problems Paternal Grandfather     Uterine Cancer Neg     Ovarian Cancer Neg        ALLERGIES:  Biaxin [clarithromycin] and Morphine sulfate    MEDICATIONS:   Current Outpatient Medications   Medication Sig Dispense Refill    Meloxicam 7.5 MG Oral Tab Take 1 tablet (7.5 mg total) by mouth daily. 30 tablet 0    SYNTHROID 88 MCG Oral Tab Take 1 tablet (88 mcg total) by mouth before breakfast. 90 tablet 2    ergocalciferol 1.25 MG (40333 UT) Oral Cap Take 1 capsule (50,000 Units total) by mouth once a week. 12 capsule 0       ROS: A comprehensive 14 point review of systems was performed and was negative aside from the aforementioned per history of present illness.    SOCIAL HX:  Social History     Tobacco Use    Smoking status: Never    Smokeless tobacco: Never   Substance Use Topics    Alcohol use: Yes     Alcohol/week: 2.0 standard drinks of alcohol     Types: 2 Standard drinks or equivalent per week     Comment: Glass wine on weekends         PE:   There were no vitals filed for this visit.  Estimated body mass index is 21.13 kg/m² as calculated from the following:    Height as of 24: 5' 5\" (1.651 m).    Weight as of  5/1/24: 127 lb (57.6 kg).    Physical Exam  Constitutional:       Appearance: Normal appearance.   HENT:      Head: Normocephalic and atraumatic.   Eyes:      Extraocular Movements: Extraocular movements intact.   Cardiovascular:      Pulses: Normal pulses. Skin warm and well perfused.  Pulmonary:      Effort: Pulmonary effort is normal. No respiratory distress.   Skin:     General: Skin is warm.   Psychiatric:         Mood and Affect: Mood normal.     Spine Exam:    Normal gait without difficulty  Able to heel, toe, tandem gait without difficulty  Level shoulders and hips in even stance    Restricted L-spine ROM    No tenderness to palpation of L-spine    Straight leg raise test: negative    Sustained clonus: negative    LE Strength: 5/5 IP QUAD TA EHL GSC  LE Sensation: normal in L2-S1 distribution  LE reflexes: normal    Radiographic Examination/Diagnostics:  XR personally viewed, independently interpreted and radiology report was reviewed.  X-ray of the lumbar spine demonstrates spondylolysis L5-S1 without spondylolisthesis.  Mild degenerative changes.      IMPRESSION: Emily Beltrán is a 58 year old female with L5-S1 spondylolysis and back pain associated with bilateral buttock pain, possible piriformis syndrome versus lumbar radiculitis at L5-S1.     PLAN:     We reviewed the patients history, symptoms, exam findings, and imaging today.  We had a detailed discussion outlining the etiology, anatomy, pathophysiology, and natural history of Orosco arelumbar radiculitis. The typical management of this condition may include lifestyle modification, NSAIDs, physical therapy, oral steroids, epidural injections, neuromodulatory medications, and sometimes pain medications.  Based on our discussion today we would like to have the patient initiate our recommendations for continued conservative therapy in the treatment of their condition noted in the assessment section.     - Provided home exercise program  - Referred to  Physical Therapy: home exercise program, aerobic exercises, core strengthening and conditioning, possible manipulative therapy,  and modalities as indicated  - Prescribed Meloxicam  - Consider MRI if symptoms do not improve    FOLLOW-UP:  We will see her back in follow-up in 6 weeks, or sooner if any problems arise. Patient understands and agrees with plan.      Eitan Orosco MD  Orthopedic Spine Surgeon  [facility]   [address] [place] [address].[facility]  [email or web address]  t: [phone]   f: [phone]        This note was dictated using Dragon software.  While it was briefly proofread prior to completion, some grammatical, spelling, and word choice errors due to dictation may still occur.

## 2024-05-29 ENCOUNTER — PATIENT MESSAGE (OUTPATIENT)
Dept: FAMILY MEDICINE CLINIC | Facility: CLINIC | Age: 59
End: 2024-05-29

## 2024-05-30 NOTE — TELEPHONE ENCOUNTER
From: Emily Beltrán  To: Karen Shook  Sent: 5/29/2024 6:53 AM CDT  Subject: Pt for sciatica and couple questions    Hi Dr Shook,    Hope all is going well for you and you had good Memorial Day weekend. Just wanted to give you update on what I have been doing for Sciatica problem.    Went to Dr Orosco beginning of May here. He said fracture was something I probably did when in my teens and that was not a problem. He said Try PT like you said. I have went like 3 times so far. I know it takes time sooo trying to be patient. Haha seems like I do a lot of the stretches which is good but not all. Anyway, I told Dr. Orosco like I had told you pretty much when I sit or lay down I have pain. He prescribed Meloxicam 7.5mg. Know u are not fan. I don’t like taking anything either. did start taking week later just to try but it really making me tired and did not see much difference than taking 2 advil So I stopped. Wondering if anything over the counter I could take? Sorry for long message. Oh also the arthritis thing he said look normal for my age. Does this have anything to do with this. Thank you Emily

## 2024-07-08 ENCOUNTER — MED REC SCAN ONLY (OUTPATIENT)
Dept: FAMILY MEDICINE CLINIC | Facility: CLINIC | Age: 59
End: 2024-07-08

## 2024-07-22 DIAGNOSIS — E03.9 HYPOTHYROIDISM, UNSPECIFIED TYPE: ICD-10-CM

## 2024-07-25 RX ORDER — LEVOTHYROXINE SODIUM 88 MCG
88 TABLET ORAL
Qty: 90 TABLET | Refills: 2 | OUTPATIENT
Start: 2024-07-25

## 2024-08-26 ENCOUNTER — OFFICE VISIT (OUTPATIENT)
Facility: CLINIC | Age: 59
End: 2024-08-26
Payer: COMMERCIAL

## 2024-08-26 VITALS
HEIGHT: 65 IN | DIASTOLIC BLOOD PRESSURE: 60 MMHG | HEART RATE: 87 BPM | SYSTOLIC BLOOD PRESSURE: 112 MMHG | WEIGHT: 125 LBS | BODY MASS INDEX: 20.83 KG/M2

## 2024-08-26 DIAGNOSIS — Z78.0 POST-MENOPAUSAL: ICD-10-CM

## 2024-08-26 DIAGNOSIS — Z01.419 WELL WOMAN EXAM WITH ROUTINE GYNECOLOGICAL EXAM: Primary | ICD-10-CM

## 2024-08-26 DIAGNOSIS — R92.30 DENSE BREAST: ICD-10-CM

## 2024-08-26 NOTE — PROGRESS NOTES
Emily Beltrán is a 58 year old female  No LMP recorded (lmp unknown). Patient is postmenopausal.   Chief Complaint   Patient presents with    Wellness Visit     Last pap smear was 23, negative    .     She does not have any vaginal bleeding.  She does not have vaginal dryness intercourse is okay with Astroglide hyaluronic acid suppositories were discussed.  Blood work is done with her primary.  Her father broke a hip she is exercising and taking vitamin D we will do a bone density she has 4 more years on her colonoscopy    OBSTETRICS HISTORY:  OB History    Para Term  AB Living   3 3 2 1   3   SAB IAB Ectopic Multiple Live Births           3      # Outcome Date GA Lbr David/2nd Weight Sex Type Anes PTL Lv   3 Term 10/04/01 37w0d   F CS-Unspec      2 Term 95 37w0d   M CS-Unspec      1  93 36w0d   F CS-Unspec          GYNE HISTORY:  Periods none due to menopause    History   Sexual Activity    Sexual activity: Yes    Partners: Male        Hx Prior Abnormal Pap: No  Pap Date: 23  Pap Result Notes: negative        MEDICAL HISTORY:  Past Medical History:    Acute maxillary sinusitis    Arthritis    Little bit    Food intolerance    Frequent use of laxatives    H/O mammogram    negative    H/O mammogram    Negative. Breasts are compsed of scattered areas of fibroglandular density.    H/O screening mammography    Negative-[facility]    History of endometrial biopsy    benign endometrial polyp    Hypothyroidism    Leukocytopenia, unspecified    Pap smear for cervical cancer screening    negative    Uncomfortable fullness after meals    Unspecified hypothyroidism       SURGICAL HISTORY:  Past Surgical History:   Procedure Laterality Date      10/4/2001, 3/24/1995, 3/9/1993    x 3    Colonoscopy      Other surgical history  2022    varicose vein removed       SOCIAL HISTORY:  Social History     Socioeconomic History    Marital status:      Spouse name: Not on  file    Number of children: Not on file    Years of education: Not on file    Highest education level: Not on file   Occupational History    Not on file   Tobacco Use    Smoking status: Never    Smokeless tobacco: Never   Vaping Use    Vaping status: Never Used   Substance and Sexual Activity    Alcohol use: Yes     Alcohol/week: 2.0 standard drinks of alcohol     Types: 2 Standard drinks or equivalent per week     Comment: Glass wine on weekends    Drug use: No    Sexual activity: Yes     Partners: Male   Other Topics Concern     Service Not Asked    Blood Transfusions Not Asked    Caffeine Concern No    Occupational Exposure Not Asked    Hobby Hazards Not Asked    Sleep Concern Not Asked    Stress Concern No    Weight Concern No    Special Diet No    Back Care Not Asked    Exercise Yes    Bike Helmet Not Asked    Seat Belt Yes    Self-Exams Not Asked   Social History Narrative    Not on file     Social Determinants of Health     Financial Resource Strain: Not on file   Food Insecurity: Not on file   Transportation Needs: Not on file   Physical Activity: Not on file   Stress: Not on file   Social Connections: Unknown (3/14/2021)    [facility], [facility]    Social Connections     Conversations with friends/family/neighbors per week: Not on file   Housing Stability: Low Risk  (7/9/2021)    Received from [facility], [facility]    Housing Stability     Mortgage Payment Concerns?: Not on file     Number of Places Lived in the Last Year: Not on file     Unstable Housing?: Not on file       FAMILY HISTORY:  Family History   Problem Relation Age of Onset    Hypertension Father     Lipids Father         hyperlipidemia    Prostate Cancer Father 65    Colon Polyps Father     Colon Polyps Mother     Cancer Mother         lung cancer    No Known Problems Daughter     No Known Problems Daughter     Allergies Son     Breast  Cancer Maternal Grandmother 70    Breast Cancer 2nd occurrence Maternal Grandmother         dx age 80's    Cancer Maternal Grandmother         Breast cancer    Cancer Maternal Grandfather         mesothelioma dx age 70's    No Known Problems Paternal Grandmother     No Known Problems Paternal Grandfather     Uterine Cancer Neg     Ovarian Cancer Neg        MEDICATIONS:    Current Outpatient Medications:     SYNTHROID 88 MCG Oral Tab, Take 1 tablet (88 mcg total) by mouth before breakfast., Disp: 90 tablet, Rfl: 2    Meloxicam 7.5 MG Oral Tab, Take 1 tablet (7.5 mg total) by mouth daily., Disp: 30 tablet, Rfl: 0    ergocalciferol 1.25 MG (85667 UT) Oral Cap, Take 1 capsule (50,000 Units total) by mouth once a week., Disp: 12 capsule, Rfl: 0    ALLERGIES:    Allergies   Allergen Reactions    Biaxin [Clarithromycin] PAIN     Abdominal pain    Morphine Sulfate NAUSEA AND VOMITING         Review of Systems:  Constitutional:  Denies fatigue, night sweats, hot flashes  Gastrointestinal:  denies heartburn, abdominal pain, diarrhea or constipation  Genitourinary:  denies dysuria, incontinence, abnormal vaginal discharge, vaginal itching  Skin/Breast:  Denies any breast pain, lumps, or discharge.   Neurological:  denies headaches, extremity weakness or numbness.      PHYSICAL EXAM:   Constitutional: well developed, well nourished  Head/Face: normocephalic  Neck/Thyroid: thyroid symmetric, no thyromegaly, no nodules, no adenopathy  Breast: normal without palpable masses, tenderness, asymmetry, nipple discharge, nipple retraction or skin changes the right breast is denser than the left at 6:00  Abdomen:  soft, nontender, nondistended, no masses  Skin/Hair: no unusual rashes or bruises   Extremities: no edema, no cyanosis  Psychiatric:  Oriented to time, place, person and situation. Appropriate mood and affect    Pelvic Exam:  External Genitalia: normal appearance, hair distribution, and no lesions  Urethral Meatus:  normal in  size, location, without lesions and prolapse  Bladder:  No fullness, masses or tenderness  Vagina:  Normal appearance without lesions, no abnormal discharge  Cervix:  Normal without tenderness on motion without lesions  Uterus: normal in size, contour, position, mobility, without tenderness  Adnexa: normal without masses or tenderness  Perineum: normal  Anus: no hemorroids     Assessment & Plan:  Emily was seen today for wellness visit.    Diagnoses and all orders for this visit:    Well woman exam with routine gynecological exam        Diagnostic mammogram with ultrasound of the right breast.  DEXA.

## 2024-10-08 ENCOUNTER — TELEPHONE (OUTPATIENT)
Facility: CLINIC | Age: 59
End: 2024-10-08

## 2024-10-08 NOTE — TELEPHONE ENCOUNTER
Patient saw GEETA on 8/26/2024 for WWE and was ordered a mammogram, she will be going to [facility] with Melecio to complete these and said the office asked to have the orders faxed to them. [facility] fax number: [phone]

## 2024-10-09 DIAGNOSIS — E03.9 HYPOTHYROIDISM, UNSPECIFIED TYPE: ICD-10-CM

## 2024-10-10 RX ORDER — LEVOTHYROXINE SODIUM 88 MCG
88 TABLET ORAL
Qty: 90 TABLET | Refills: 2 | OUTPATIENT
Start: 2024-10-10

## 2024-10-10 NOTE — TELEPHONE ENCOUNTER
Disp Refills Start End     SYNTHROID 88 MCG Oral Tab 90 tablet 2 5/1/2024 --    Sig - Route: Take 1 tablet (88 mcg total) by mouth before breakfast. - Oral    Sent to pharmacy as: Synthroid 88 MCG Oral Tablet    E-Prescribing Status: Receipt confirmed by pharmacy (5/1/2024 11:40 AM CDT)      Associated Diagnoses    Hypothyroidism, unspecified type        Pharmacy    [id number] - [place], [place] - [address] [phone], [phone]

## 2024-10-21 ENCOUNTER — TELEPHONE (OUTPATIENT)
Facility: CLINIC | Age: 59
End: 2024-10-21

## 2024-10-21 DIAGNOSIS — Z12.31 ENCOUNTER FOR SCREENING MAMMOGRAM FOR MALIGNANT NEOPLASM OF BREAST: Primary | ICD-10-CM

## 2024-10-21 NOTE — TELEPHONE ENCOUNTER
Viry from [facility] scheduling called in regard to patient mammogram order. Diagnosis code is incorrect

## 2024-10-21 NOTE — TELEPHONE ENCOUNTER
Spoke with DELANEY Baires. They need clarification on mammogram/ultrasound orders. Diagnostic mammogram & right breast ultrasound has diagnosis code R92.30, dense breasts. Insurance will not cover imaging for dense breasts.     Per office visit notes: Breast: normal without palpable masses, tenderness, asymmetry, nipple discharge, nipple retraction or skin changes the right breast is denser than the left at 6:00    They need a new order or updated ICD 10 codes.  Will route for review.

## 2024-10-24 ENCOUNTER — TELEPHONE (OUTPATIENT)
Facility: CLINIC | Age: 59
End: 2024-10-24

## 2024-10-24 DIAGNOSIS — N64.4 BREAST PAIN: Primary | ICD-10-CM

## 2024-10-24 NOTE — TELEPHONE ENCOUNTER
Per Shyann from the [facility], they need a diagnostic bilateral mammogram with right breast ultrasound, instead of the screening mammogram. Please fax the new order to Fax# [phone]. Thank you

## 2024-10-24 NOTE — TELEPHONE ENCOUNTER
Spoke with DELANEY Boothe. She said the patient is complaining of right breast pain and enlargement. Said this was discussed with Dr. Pedersen at her 8/26/24 appointment. They need a new order for a bilateral diagnostic mammogram & right breast ultrasound. Orders pended. Aware we will fax them over once signed.

## 2024-11-12 ENCOUNTER — NURSE TRIAGE (OUTPATIENT)
Dept: FAMILY MEDICINE CLINIC | Facility: CLINIC | Age: 59
End: 2024-11-12

## 2024-11-12 ENCOUNTER — OFFICE VISIT (OUTPATIENT)
Dept: FAMILY MEDICINE CLINIC | Facility: CLINIC | Age: 59
End: 2024-11-12
Payer: COMMERCIAL

## 2024-11-12 VITALS
WEIGHT: 129 LBS | HEIGHT: 65 IN | TEMPERATURE: 98 F | SYSTOLIC BLOOD PRESSURE: 118 MMHG | BODY MASS INDEX: 21.49 KG/M2 | HEART RATE: 62 BPM | RESPIRATION RATE: 18 BRPM | OXYGEN SATURATION: 98 % | DIASTOLIC BLOOD PRESSURE: 72 MMHG

## 2024-11-12 DIAGNOSIS — J01.00 ACUTE NON-RECURRENT MAXILLARY SINUSITIS: Primary | ICD-10-CM

## 2024-11-12 PROCEDURE — 3008F BODY MASS INDEX DOCD: CPT | Performed by: FAMILY MEDICINE

## 2024-11-12 PROCEDURE — 99213 OFFICE O/P EST LOW 20 MIN: CPT | Performed by: FAMILY MEDICINE

## 2024-11-12 PROCEDURE — 3078F DIAST BP <80 MM HG: CPT | Performed by: FAMILY MEDICINE

## 2024-11-12 PROCEDURE — 3074F SYST BP LT 130 MM HG: CPT | Performed by: FAMILY MEDICINE

## 2024-11-12 RX ORDER — SULFAMETHOXAZOLE AND TRIMETHOPRIM 800; 160 MG/1; MG/1
1 TABLET ORAL 2 TIMES DAILY
Qty: 14 TABLET | Refills: 0 | Status: SHIPPED | OUTPATIENT
Start: 2024-11-12

## 2024-11-12 NOTE — TELEPHONE ENCOUNTER
Called patient, she is able to come in at 2:40, appt scheduled    Future Appointments   Date Time Provider Department Center   11/12/2024  2:40 PM Karen Shook MD [facility]   12/4/2024  9:00 AM Karen Shook MD [facility]   8/27/2025  9:30 AM Tiffany Pedersen MD [facility]

## 2024-11-12 NOTE — TELEPHONE ENCOUNTER
Action Requested: Summary for Provider     []  Critical Lab, Recommendations Needed  [x] Need Additional Advice  []   FYI    []   Need Orders  [] Need Medications Sent to Pharmacy  []  Other     SUMMARY: Dr. Shook- would you be able to see pt this afternoon? Patient off of work at 2.     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     Received call from pt. Patient stated she was on amoxicillin after dental procedure, finished this today. Yesterday pt noticed swelling in L side of face near sinus. Patient with sinus pain and L ear pain. Patient asking if Dr. Shook can see her this afternoon, she is concerned because she is flying on Thursday. Informed her we can send message to Dr. Shook to asking able to accommodate. Patient said we can MC back as she is at work, pt is off work at 2.       Reason for call: Acute and Sinus Problem  Onset: Yesterday         Reason for Disposition   Earache    Protocols used: Sinus Pain or Congestion-A-OH

## 2024-11-12 NOTE — PROGRESS NOTES
Family Medicine Progress Note  ASSESSMENT AND PLAN:  Emily Beltrán is a 59 year old female who is here for:     Emily was seen today for sinus problem.    Diagnoses and all orders for this visit:    Acute non-recurrent maxillary sinusitis  -     sulfamethoxazole-trimethoprim DS (BACTRIM DS) 800-160 MG Oral Tab per tablet; Take 1 tablet by mouth 2 (two) times daily.  -     continue sudafed  -     nasal saline  -     continue ibuprofen for pain as needed         The patient indicates understanding of these issues and agrees to the plan.  Follow-Up: The patient is asked to return in Return if symptoms worsen or fail to improve.  .     Karen Shook MD   11/12/24      CC: Sinus Problem    HPI:   Emily Beltrán is a 59 year old female who presents for Sinus Problem     Patient complaining of left sided facial pain and swelling for the past 2 days, states had a dental procedure and thought pain was from that, followed up with her dentist and was told that it is likely due to her sinuses.    ALLERGY:     Allergies as of 11/12/2024 - Review Complete 08/26/2024   Allergen Reaction Noted    Biaxin [clarithromycin] PAIN 06/06/2013    Morphine sulfate NAUSEA AND VOMITING 06/06/2013     MEDICATIONS:     Current Outpatient Medications   Medication Sig Dispense Refill    sulfamethoxazole-trimethoprim DS (BACTRIM DS) 800-160 MG Oral Tab per tablet Take 1 tablet by mouth 2 (two) times daily. 14 tablet 0    Meloxicam 7.5 MG Oral Tab Take 1 tablet (7.5 mg total) by mouth daily. 30 tablet 0    SYNTHROID 88 MCG Oral Tab Take 1 tablet (88 mcg total) by mouth before breakfast. 90 tablet 2    ergocalciferol 1.25 MG (39092 UT) Oral Cap Take 1 capsule (50,000 Units total) by mouth once a week. 12 capsule 0      Past Medical History:    Acute maxillary sinusitis    Arthritis    Little bit    Food intolerance    Frequent use of laxatives    H/O mammogram    negative    H/O mammogram    Negative. Breasts are compsed of scattered areas of  fibroglandular density.    H/O screening mammography    Negative-[facility]    History of endometrial biopsy    benign endometrial polyp    Hypothyroidism    Leukocytopenia, unspecified    Pap smear for cervical cancer screening    negative    Uncomfortable fullness after meals    Unspecified hypothyroidism      Social History:  Social History     Socioeconomic History    Marital status:    Tobacco Use    Smoking status: Never    Smokeless tobacco: Never   Vaping Use    Vaping status: Never Used   Substance and Sexual Activity    Alcohol use: Yes     Alcohol/week: 2.0 standard drinks of alcohol     Types: 2 Standard drinks or equivalent per week     Comment: Glass wine on weekends    Drug use: No    Sexual activity: Yes     Partners: Male   Other Topics Concern    Caffeine Concern No    Stress Concern No    Weight Concern No    Special Diet No    Exercise Yes    Seat Belt Yes     Social Drivers of Health      [facility]    Social Connections    [facility] from [facility], [facility]    Housing Stability        REVIEW OF SYSTEMS:   A comprehensive 10 point review of systems was completed.  Pertinent positives and negatives noted in the the HPI.      EXAM:   /72   Pulse 62   Temp 97.7 °F (36.5 °C) (Temporal)   Resp 18   Ht 5' 5\" (1.651 m)   Wt 129 lb (58.5 kg)   LMP  (LMP Unknown)   SpO2 98%   BMI 21.47 kg/m²   GENERAL: well developed, well nourished,in no apparent distress  SKIN: no rashes,no suspicious lesions  HEENT: atraumatic, normocephalic, bilateral external ear canals and TM's are normal, nasal mucosa congested, tenderness to palpation over the maxillary sinuses, left more than right  NECK: supple,no adenopathy  LUNGS: clear to auscultation  CARDIO: RRR without murmur      NOTE TO PATIENT: The 21st Century Cures Act makes clinical notes like these available to patients in the interest of  transparency. Clinical notes are medical documents used by physicians and care providers to communicate with each other. These documents include medical language and terminology, abbreviations, and treatment information that may sound technical and at times possibly unfamiliar. In addition, at times, the verbiage may appear blunt or direct. These documents are one tool providers use to communicate relevant information and clinical opinions of the care providers in a way that allows common understanding of the clinical context.      Karen Shook MD    11/12/24 2:35 PM

## 2024-11-22 ENCOUNTER — IMMUNIZATION (OUTPATIENT)
Dept: FAMILY MEDICINE CLINIC | Facility: CLINIC | Age: 59
End: 2024-11-22
Payer: COMMERCIAL

## 2024-11-22 DIAGNOSIS — Z23 NEED FOR VACCINATION: Primary | ICD-10-CM

## 2024-11-22 PROCEDURE — 90471 IMMUNIZATION ADMIN: CPT | Performed by: FAMILY MEDICINE

## 2024-11-22 PROCEDURE — 90656 IIV3 VACC NO PRSV 0.5 ML IM: CPT | Performed by: FAMILY MEDICINE

## 2024-12-04 ENCOUNTER — MED REC SCAN ONLY (OUTPATIENT)
Dept: FAMILY MEDICINE CLINIC | Facility: CLINIC | Age: 59
End: 2024-12-04

## 2024-12-04 ENCOUNTER — OFFICE VISIT (OUTPATIENT)
Dept: FAMILY MEDICINE CLINIC | Facility: CLINIC | Age: 59
End: 2024-12-04
Payer: COMMERCIAL

## 2024-12-04 VITALS
WEIGHT: 125 LBS | TEMPERATURE: 98 F | RESPIRATION RATE: 18 BRPM | OXYGEN SATURATION: 98 % | HEART RATE: 82 BPM | HEIGHT: 65 IN | SYSTOLIC BLOOD PRESSURE: 110 MMHG | DIASTOLIC BLOOD PRESSURE: 78 MMHG | BODY MASS INDEX: 20.83 KG/M2

## 2024-12-04 DIAGNOSIS — Z00.00 ROUTINE GENERAL MEDICAL EXAMINATION AT A HEALTH CARE FACILITY: Primary | ICD-10-CM

## 2024-12-04 DIAGNOSIS — E03.9 HYPOTHYROIDISM, UNSPECIFIED TYPE: ICD-10-CM

## 2024-12-04 DIAGNOSIS — E55.9 VITAMIN D DEFICIENCY: ICD-10-CM

## 2024-12-04 DIAGNOSIS — Z13.1 SCREENING FOR DIABETES MELLITUS: ICD-10-CM

## 2024-12-04 PROCEDURE — 3008F BODY MASS INDEX DOCD: CPT | Performed by: FAMILY MEDICINE

## 2024-12-04 PROCEDURE — 3078F DIAST BP <80 MM HG: CPT | Performed by: FAMILY MEDICINE

## 2024-12-04 PROCEDURE — 3074F SYST BP LT 130 MM HG: CPT | Performed by: FAMILY MEDICINE

## 2024-12-04 PROCEDURE — 99396 PREV VISIT EST AGE 40-64: CPT | Performed by: FAMILY MEDICINE

## 2024-12-04 NOTE — PROGRESS NOTES
Family Medicine Progress Note    Emily Beltrán is a 59 year old female.  ASSESSMENT AND PLAN:  Emily was seen today for physical.    Diagnoses and all orders for this visit:    Routine general medical examination at a health care facility  -     CBC With Differential With Platelet; Future  -     Lipid Panel; Future  -     Comp Metabolic Panel (14); Future  -     form for work completed    Vitamin D deficiency  -     Vitamin D; Future    Hypothyroidism, unspecified type  -     TSH and Free T4 [E]; Future    Screening for diabetes mellitus  -     Hemoglobin A1C [E]; Future    Age appropriate anticipatory guidance reviewed  Health Maintenance   Topic Date Due    COVID-19 Vaccine (4 - 2024-25 season) 09/01/2024    Annual Physical  10/25/2024    DTaP,Tdap,and Td Vaccines (2 - Td or Tdap) 10/23/2025    Mammogram  11/20/2025    Pap Smear  08/07/2026    Colorectal Cancer Screening  08/16/2028    Influenza Vaccine  Completed    Annual Depression Screening  Completed    Zoster Vaccines  Completed    Pneumococcal Vaccine: Birth to 64yrs  Aged Out     The patient indicates understanding of these issues and agrees to the plan.  Follow-Up: The patient is asked to return in Return in about 1 year (around 12/4/2025) for annual.  .     Karen Shook MD   12/04/24      HPI:   Emily Beltrán is a 59 year old female with history of hypothyroidism seen for her annual physical.  Pap done last year by her Gyn Dr. Pedersen was normal, post menopausal, denies any spotting or bleeding.  Does do breast self-exam, mammogram done in October was normal.  family history of breast cancer in maternal grandmother..  Colonoscopy done in 2018 was normal, repeat in 10 years.  Denies any constipation or blood in stool.    Patient states mother was diagnosed with lung cancer, was not a smoker but was obese.  Patient currently does not have any chest pain, shortness of breath, cough, weight loss or night  sweats.    Compliant with her thyroid medication, denies any weight changes, intolerance to heat or cold, mood changes, constipation, dry skin or hair loss.    Patient states sciatica pain is better since she stopped running, has been walking more for exercise.    PAST MEDICAL HISTORY:     Past Medical History:    Acute maxillary sinusitis    Arthritis    Little bit    Encounter for breast cancer screening other than mammogram    RIGHT BREAST ULTRASOUND FINDINGS: Subareolar ultrasound was performed. No solid or cystic mass is seen.    Food intolerance    Frequent use of laxatives    H/O mammogram    negative    H/O mammogram    Negative. Breasts are compsed of scattered areas of fibroglandular density.    H/O screening mammography    Negative exam   RECOMMENDATION: A 1 year screening mammogram is recommended.    History of endometrial biopsy    benign endometrial polyp    Hypothyroidism    Leukocytopenia, unspecified    Pap smear for cervical cancer screening    negative    Uncomfortable fullness after meals    Unspecified hypothyroidism     PAST SURGICAL HISTORY:     Past Surgical History:   Procedure Laterality Date      10/4/2001, 3/24/1995, 3/9/1993    x 3    Colonoscopy      Other surgical history  2022    varicose vein removed     ALLERGY:     Allergies as of 2024 - Review Complete 2024   Allergen Reaction Noted    Biaxin [clarithromycin] PAIN 2013    Morphine sulfate NAUSEA AND VOMITING 2013       MEDICATIONS:     Current Outpatient Medications   Medication Sig Dispense Refill    sulfamethoxazole-trimethoprim DS (BACTRIM DS) 800-160 MG Oral Tab per tablet Take 1 tablet by mouth 2 (two) times daily. 14 tablet 0    Meloxicam 7.5 MG Oral Tab Take 1 tablet (7.5 mg total) by mouth daily. 30 tablet 0    SYNTHROID 88 MCG Oral Tab Take 1 tablet (88 mcg total) by mouth before breakfast. 90 tablet 2    ergocalciferol 1.25 MG (79987 UT) Oral Cap Take 1 capsule (50,000 Units total)  by mouth once a week. 12 capsule 0     FAMILY HISTORY:     Family History   Problem Relation Age of Onset    Hypertension Father     Lipids Father         hyperlipidemia    Prostate Cancer Father 65    Colon Polyps Father     Colon Polyps Mother     Cancer Mother         lung cancer    No Known Problems Daughter     No Known Problems Daughter     Allergies Son     Breast Cancer Maternal Grandmother 70    Breast Cancer 2nd occurrence Maternal Grandmother         dx age 80's    Cancer Maternal Grandmother         Breast cancer    Cancer Maternal Grandfather         mesothelioma dx age 70's    No Known Problems Paternal Grandmother     No Known Problems Paternal Grandfather     Uterine Cancer Neg     Ovarian Cancer Neg      SOCIAL HISTORY:     Social History     Socioeconomic History    Marital status:    Tobacco Use    Smoking status: Never    Smokeless tobacco: Never   Vaping Use    Vaping status: Never Used   Substance and Sexual Activity    Alcohol use: Yes     Alcohol/week: 2.0 standard drinks of alcohol     Types: 2 Standard drinks or equivalent per week     Comment: Glass wine on weekends    Drug use: No    Sexual activity: Yes     Partners: Male   Other Topics Concern    Caffeine Concern No    Stress Concern No    Weight Concern No    Special Diet No    Exercise Yes    Seat Belt Yes     Social Drivers of Health      Received from [facility], [facility]    Social Connections    Received from [facility], [facility]    Housing Stability     REVIEW OF SYSTEMS:   Review of Systems   Constitutional:  Negative for appetite change, fatigue, fever and unexpected weight change.   HENT:  Negative for congestion, ear pain, hearing loss and sore throat.    Eyes:  Negative for discharge, redness and visual disturbance.   Respiratory:  Negative for cough, chest tightness and shortness of breath.    Cardiovascular:  Negative for chest pain  and palpitations.   Gastrointestinal:  Negative for abdominal pain, blood in stool, constipation and nausea.   Endocrine: Negative for cold intolerance, heat intolerance and polyuria.   Genitourinary:  Negative for difficulty urinating, dysuria, frequency and urgency.   Musculoskeletal:  Negative for arthralgias, gait problem, joint swelling and myalgias.   Skin:  Negative for rash.   Allergic/Immunologic: Negative for food allergies.   Neurological:  Negative for dizziness, weakness, numbness and headaches.   Hematological:  Negative for adenopathy. Does not bruise/bleed easily.   Psychiatric/Behavioral:  Negative for dysphoric mood and sleep disturbance. The patient is not nervous/anxious.         PHYSICAL EXAM:   /78   Pulse 82   Temp 98.2 °F (36.8 °C) (Temporal)   Resp 18   Ht 5' 5\" (1.651 m)   Wt 125 lb (56.7 kg)   LMP  (LMP Unknown)   SpO2 98%   BMI 20.80 kg/m²     Physical Exam  Constitutional:       General: She is not in acute distress.     Appearance: Normal appearance. She is well-developed and normal weight.   HENT:      Head: Normocephalic and atraumatic.      Right Ear: Tympanic membrane, ear canal and external ear normal.      Left Ear: Tympanic membrane, ear canal and external ear normal.      Nose: Nose normal.      Mouth/Throat:      Mouth: Mucous membranes are moist.      Pharynx: Oropharynx is clear.   Eyes:      Extraocular Movements: Extraocular movements intact.      Conjunctiva/sclera: Conjunctivae normal.      Pupils: Pupils are equal, round, and reactive to light.   Neck:      Thyroid: No thyromegaly.      Vascular: No carotid bruit.   Cardiovascular:      Rate and Rhythm: Normal rate and regular rhythm.      Pulses: Normal pulses.      Heart sounds: Normal heart sounds. No murmur heard.  Pulmonary:      Effort: Pulmonary effort is normal. No respiratory distress.      Breath sounds: Normal breath sounds.   Chest:      Chest wall: No tenderness.   Abdominal:      General:  Bowel sounds are normal. There is no distension.      Palpations: Abdomen is soft. There is no hepatomegaly, splenomegaly or mass.      Tenderness: There is no abdominal tenderness.      Hernia: No hernia is present.   Musculoskeletal:         General: Normal range of motion.      Cervical back: Normal range of motion and neck supple.      Right lower leg: No edema.      Left lower leg: No edema.   Lymphadenopathy:      Cervical: No cervical adenopathy.   Skin:     General: Skin is warm.      Findings: No rash.   Neurological:      General: No focal deficit present.      Mental Status: She is alert and oriented to person, place, and time.      Cranial Nerves: No cranial nerve deficit.      Sensory: No sensory deficit.      Motor: No weakness.      Coordination: Coordination normal.      Gait: Gait normal.      Deep Tendon Reflexes: Reflexes are normal and symmetric. Reflexes normal.   Psychiatric:         Mood and Affect: Mood normal.         Behavior: Behavior normal.         Thought Content: Thought content normal.         Judgment: Judgment normal.        The 21st Century Cures Act makes medical notes like these available to patients in the interest of transparency. Please be advised this is a medical document. Medical documents are intended to carry relevant information, facts as evident, and the clinical opinion of the practitioner. The medical note is intended as peer to peer communication and may appear blunt or direct. It is written in medical language and may contain abbreviations or verbiage that are unfamiliar.     Karen Shook MD

## 2024-12-06 ENCOUNTER — LAB ENCOUNTER (OUTPATIENT)
Dept: LAB | Age: 59
End: 2024-12-06
Attending: FAMILY MEDICINE
Payer: COMMERCIAL

## 2024-12-06 DIAGNOSIS — Z13.1 SCREENING FOR DIABETES MELLITUS: ICD-10-CM

## 2024-12-06 DIAGNOSIS — E55.9 VITAMIN D DEFICIENCY: ICD-10-CM

## 2024-12-06 DIAGNOSIS — Z00.00 ROUTINE GENERAL MEDICAL EXAMINATION AT A HEALTH CARE FACILITY: ICD-10-CM

## 2024-12-06 DIAGNOSIS — E03.9 HYPOTHYROIDISM, UNSPECIFIED TYPE: ICD-10-CM

## 2024-12-06 LAB
ALBUMIN SERPL-MCNC: 4.1 G/DL (ref 3.2–4.8)
ALBUMIN/GLOB SERPL: 1.5 {RATIO} (ref 1–2)
ALP LIVER SERPL-CCNC: 82 U/L
ALT SERPL-CCNC: 33 U/L
ANION GAP SERPL CALC-SCNC: 6 MMOL/L (ref 0–18)
AST SERPL-CCNC: 29 U/L (ref ?–34)
BASOPHILS # BLD AUTO: 0.07 X10(3) UL (ref 0–0.2)
BASOPHILS NFR BLD AUTO: 1.6 %
BILIRUB SERPL-MCNC: 0.9 MG/DL (ref 0.3–1.2)
BUN BLD-MCNC: 16 MG/DL (ref 9–23)
CALCIUM BLD-MCNC: 10.1 MG/DL (ref 8.7–10.4)
CHLORIDE SERPL-SCNC: 105 MMOL/L (ref 98–112)
CHOLEST SERPL-MCNC: 224 MG/DL (ref ?–200)
CO2 SERPL-SCNC: 27 MMOL/L (ref 21–32)
CREAT BLD-MCNC: 0.81 MG/DL
EGFRCR SERPLBLD CKD-EPI 2021: 84 ML/MIN/1.73M2 (ref 60–?)
EOSINOPHIL # BLD AUTO: 0.07 X10(3) UL (ref 0–0.7)
EOSINOPHIL NFR BLD AUTO: 1.6 %
ERYTHROCYTE [DISTWIDTH] IN BLOOD BY AUTOMATED COUNT: 12.2 %
EST. AVERAGE GLUCOSE BLD GHB EST-MCNC: 108 MG/DL (ref 68–126)
FASTING PATIENT LIPID ANSWER: YES
FASTING STATUS PATIENT QL REPORTED: YES
GLOBULIN PLAS-MCNC: 2.8 G/DL (ref 2–3.5)
GLUCOSE BLD-MCNC: 91 MG/DL (ref 70–99)
HBA1C MFR BLD: 5.4 % (ref ?–5.7)
HCT VFR BLD AUTO: 42.3 %
HDLC SERPL-MCNC: 84 MG/DL (ref 40–59)
HGB BLD-MCNC: 14.4 G/DL
IMM GRANULOCYTES # BLD AUTO: 0.01 X10(3) UL (ref 0–1)
IMM GRANULOCYTES NFR BLD: 0.2 %
LDLC SERPL CALC-MCNC: 128 MG/DL (ref ?–100)
LYMPHOCYTES # BLD AUTO: 1.35 X10(3) UL (ref 1–4)
LYMPHOCYTES NFR BLD AUTO: 31.6 %
MCH RBC QN AUTO: 32.2 PG (ref 26–34)
MCHC RBC AUTO-ENTMCNC: 34 G/DL (ref 31–37)
MCV RBC AUTO: 94.6 FL
MONOCYTES # BLD AUTO: 0.29 X10(3) UL (ref 0.1–1)
MONOCYTES NFR BLD AUTO: 6.8 %
NEUTROPHILS # BLD AUTO: 2.48 X10 (3) UL (ref 1.5–7.7)
NEUTROPHILS # BLD AUTO: 2.48 X10(3) UL (ref 1.5–7.7)
NEUTROPHILS NFR BLD AUTO: 58.2 %
NONHDLC SERPL-MCNC: 140 MG/DL (ref ?–130)
OSMOLALITY SERPL CALC.SUM OF ELEC: 287 MOSM/KG (ref 275–295)
PLATELET # BLD AUTO: 239 10(3)UL (ref 150–450)
POTASSIUM SERPL-SCNC: 4 MMOL/L (ref 3.5–5.1)
PROT SERPL-MCNC: 6.9 G/DL (ref 5.7–8.2)
RBC # BLD AUTO: 4.47 X10(6)UL
SODIUM SERPL-SCNC: 138 MMOL/L (ref 136–145)
T4 FREE SERPL-MCNC: 1.4 NG/DL (ref 0.8–1.7)
TRIGL SERPL-MCNC: 70 MG/DL (ref 30–149)
TSI SER-ACNC: 2.6 UIU/ML (ref 0.55–4.78)
VIT D+METAB SERPL-MCNC: 37.3 NG/ML (ref 30–100)
VLDLC SERPL CALC-MCNC: 12 MG/DL (ref 0–30)
WBC # BLD AUTO: 4.3 X10(3) UL (ref 4–11)

## 2024-12-06 PROCEDURE — 82306 VITAMIN D 25 HYDROXY: CPT

## 2024-12-06 PROCEDURE — 80061 LIPID PANEL: CPT

## 2024-12-06 PROCEDURE — 84443 ASSAY THYROID STIM HORMONE: CPT

## 2024-12-06 PROCEDURE — 85025 COMPLETE CBC W/AUTO DIFF WBC: CPT

## 2024-12-06 PROCEDURE — 84439 ASSAY OF FREE THYROXINE: CPT

## 2024-12-06 PROCEDURE — 36415 COLL VENOUS BLD VENIPUNCTURE: CPT

## 2024-12-06 PROCEDURE — 80053 COMPREHEN METABOLIC PANEL: CPT

## 2024-12-06 PROCEDURE — 83036 HEMOGLOBIN GLYCOSYLATED A1C: CPT

## 2025-02-25 ENCOUNTER — TELEPHONE (OUTPATIENT)
Facility: CLINIC | Age: 60
End: 2025-02-25

## 2025-02-25 ENCOUNTER — NURSE TRIAGE (OUTPATIENT)
Dept: FAMILY MEDICINE CLINIC | Facility: CLINIC | Age: 60
End: 2025-02-25

## 2025-02-25 NOTE — TELEPHONE ENCOUNTER
Action Requested: Summary for Provider     []  Critical Lab, Recommendations Needed  [] Need Additional Advice  []   FYI    []   Need Orders  [] Need Medications Sent to Pharmacy  []  Other     SUMMARY: Pt agreeable to go to [facility], however stated she will go tomorrow morning, not this evening. Advised to f/u here as needed.     Reason for call: Bladder Problem  Onset: Past week   Reason for Disposition   Can't control passage of urine (i.e., urinary incontinence) and new-onset (< 2 weeks) or worsening    Protocols used: Urinary Symptoms-A-OH    C/o bladder leakage, however does not smell like urine   Happening over the past week  Has to wear a pad due to leakage  Stated will happen randomly  Denies urinary urgency   Denies pain and burning with urination. Denies back pain. Denies blood in urine.   Has been urinating frequently because she is trying to avoid the leakage from happening   Called palma, who advised to reach out to PCP   Advised needs eval today to rule out acute infection. No apts here. Provided [facility] address. Pt agreeable to [facility] however states she will go tomorrow morning

## 2025-02-25 NOTE — TELEPHONE ENCOUNTER
Spoke with patient. Patient states she is leaking urine throughout the day and night. Patient denies UTI symptoms. Denies leaking from the vagina. Advised patient to reach out to PCP or Urologist. Understanding verbalized.

## 2025-02-26 ENCOUNTER — OFFICE VISIT (OUTPATIENT)
Dept: FAMILY MEDICINE CLINIC | Facility: CLINIC | Age: 60
End: 2025-02-26
Payer: COMMERCIAL

## 2025-02-26 VITALS
RESPIRATION RATE: 16 BRPM | HEART RATE: 56 BPM | TEMPERATURE: 98 F | DIASTOLIC BLOOD PRESSURE: 70 MMHG | OXYGEN SATURATION: 98 % | SYSTOLIC BLOOD PRESSURE: 106 MMHG

## 2025-02-26 DIAGNOSIS — R39.9 URINARY SYMPTOM OR SIGN: Primary | ICD-10-CM

## 2025-02-26 LAB
APPEARANCE: CLEAR
BILIRUBIN: NEGATIVE
GLUCOSE (URINE DIPSTICK): NEGATIVE MG/DL
KETONES (URINE DIPSTICK): NEGATIVE MG/DL
MULTISTIX LOT#: ABNORMAL NUMERIC
NITRITE, URINE: NEGATIVE
PH, URINE: 7 (ref 4.5–8)
PROTEIN (URINE DIPSTICK): NEGATIVE MG/DL
SPECIFIC GRAVITY: 1.01 (ref 1–1.03)
URINE-COLOR: YELLOW
UROBILINOGEN,SEMI-QN: 0.2 MG/DL (ref 0–1.9)

## 2025-02-26 PROCEDURE — 3078F DIAST BP <80 MM HG: CPT | Performed by: NURSE PRACTITIONER

## 2025-02-26 PROCEDURE — 87086 URINE CULTURE/COLONY COUNT: CPT | Performed by: NURSE PRACTITIONER

## 2025-02-26 PROCEDURE — 99213 OFFICE O/P EST LOW 20 MIN: CPT | Performed by: NURSE PRACTITIONER

## 2025-02-26 PROCEDURE — 3074F SYST BP LT 130 MM HG: CPT | Performed by: NURSE PRACTITIONER

## 2025-02-26 PROCEDURE — 81003 URINALYSIS AUTO W/O SCOPE: CPT | Performed by: NURSE PRACTITIONER

## 2025-02-26 RX ORDER — NITROFURANTOIN 25; 75 MG/1; MG/1
100 CAPSULE ORAL 2 TIMES DAILY
Qty: 14 CAPSULE | Refills: 0 | Status: SHIPPED | OUTPATIENT
Start: 2025-02-26 | End: 2025-03-05

## 2025-02-26 NOTE — PROGRESS NOTES
CHIEF COMPLAINT:     Chief Complaint   Patient presents with    UTI     Leakage x 1 week, denies frequency, pain/burning       HPI:   Emily Beltrán is a 59 year old female who presents with symptoms of UTI. Complaining of some mild urinary incontinence at times, small dribbles of urine can come out.  Doesn't feel its with coughing/sneezing or pressure.  NO dysuria, no frequency, occasionally some urgency. . Symptoms have been consistent since onset.  Treatments tried: none.  Associated symptoms: none.   Denies flank pain, fever, hematuria, nausea, or vomiting.  Denies vaginal discharge or itching.     Current Outpatient Medications   Medication Sig Dispense Refill    sulfamethoxazole-trimethoprim DS (BACTRIM DS) 800-160 MG Oral Tab per tablet Take 1 tablet by mouth 2 (two) times daily. 14 tablet 0    Meloxicam 7.5 MG Oral Tab Take 1 tablet (7.5 mg total) by mouth daily. 30 tablet 0    SYNTHROID 88 MCG Oral Tab Take 1 tablet (88 mcg total) by mouth before breakfast. 90 tablet 2    ergocalciferol 1.25 MG (45759 UT) Oral Cap Take 1 capsule (50,000 Units total) by mouth once a week. 12 capsule 0      Past Medical History:    Acute maxillary sinusitis    Arthritis    Little bit    Encounter for breast cancer screening other than mammogram    RIGHT BREAST ULTRASOUND FINDINGS: Subareolar ultrasound was performed. No solid or cystic mass is seen.    Food intolerance    Frequent use of laxatives    H/O mammogram    negative    H/O mammogram    Negative. Breasts are compsed of scattered areas of fibroglandular density.    H/O screening mammography    Negative exam   RECOMMENDATION: A 1 year screening mammogram is recommended.    History of endometrial biopsy    benign endometrial polyp    Hypothyroidism    Leukocytopenia, unspecified    Pap smear for cervical cancer screening    negative    Uncomfortable fullness after meals    Unspecified hypothyroidism      Social History:  Social History     Socioeconomic History     Marital status:    Tobacco Use    Smoking status: Never    Smokeless tobacco: Never   Vaping Use    Vaping status: Never Used   Substance and Sexual Activity    Alcohol use: Yes     Alcohol/week: 2.0 standard drinks of alcohol     Types: 2 Standard drinks or equivalent per week     Comment: Glass wine on weekends    Drug use: No    Sexual activity: Yes     Partners: Male   Other Topics Concern    Caffeine Concern No    Stress Concern No    Weight Concern No    Special Diet No    Exercise Yes    Seat Belt Yes     Social Drivers of Health      [facility] from [facility], [facility]    Housing Stability         REVIEW OF SYSTEMS:   GENERAL: Denies fever, chills, or body aches  SKIN: no rashes, no skin wounds or ulcers.  EYES:denies blurred vision or double vision  HEENT: no congestion, rhinorrhea, sore throat or ear pain  CARDIOVASCULAR: denies chest pain or palpitations  LUNGS: denies shortness of breath, cough, or wheezing  GI: See HPI. No N/V/C/D.   : See HPI.  NEURO: no headaches.    EXAM:   /70   Pulse 56   Temp 97.6 °F (36.4 °C)   Resp 16   LMP  (LMP Unknown)   SpO2 98%   GENERAL: well developed, well nourished,in no apparent distress  CARDIO: RRR, no murmurs  LUNGS: clear to ausculation bilaterally, no wheezing or rhonchi  GI: BS present x 4.  No hepatosplenomegaly.  : No suprapubic tenderness, bladder distention, or CVAT     Recent Results (from the past 24 hours)   URINALYSIS, AUTO, W/O SCOPE    Collection Time: 02/26/25 10:06 AM   Result Value Ref Range    Glucose Urine Negative Negative mg/dL    Bilirubin Urine Negative Negative    Ketones, UA Negative Negative - Trace mg/dL    Spec Gravity 1.010 1.005 - 1.030    Blood Urine Trace-intact (A) Negative    PH Urine 7.0 5.0 - 8.0    Protein Urine Negative Negative - Trace mg/dL    Urobilinogen Urine 0.2 0.2 - 1.0 mg/dL    Nitrite Urine Negative Negative    Leukocyte Esterase Urine Small (A) Negative     APPEARANCE clear Clear    Color Urine yellow Yellow    Multistix Lot# [id number] Numeric    Multistix Expiration Date 10/31/25 Date         ASSESSMENT AND PLAN:   Emily Beltrán is a 59 year old female presents with UTI symptoms.    ASSESSMENT:  Encounter Diagnosis   Name Primary?    Urinary symptom or sign Yes       PLAN: Meds as listed below.  Comfort measures as described in Patient Instructions. Patient/Parent has given us consent to send out a culture and understand that they will be contacted in 2-3 days with culture results. If any severe back pain, inability to urinate, fever >101 or persistent vomiting seek emergent care.         Meds & Refills for this Visit:  Requested Prescriptions      No prescriptions requested or ordered in this encounter       Risk and benefits of medication discussed. Stressed importance of completing full course of antibiotic.     There are no Patient Instructions on file for this visit.      The patient indicates understanding of these issues and agrees to the plan.  The patient is asked to return in 3 days if not better. Call if fever, vomiting, worsening symptoms.  Go to ED if back/flank pain with fever and vomiting.

## 2025-03-03 ENCOUNTER — TELEPHONE (OUTPATIENT)
Dept: FAMILY MEDICINE CLINIC | Facility: CLINIC | Age: 60
End: 2025-03-03

## 2025-03-03 NOTE — TELEPHONE ENCOUNTER
Dr. Shook: patient wants to know if you are ok with her taking a new supplement for her skin. Saranya Sutherland MD: Dermal Repair Complex. Ingredients listed below. Please advise    Received call from patient inquiring if Dr. Shook would be ok with patient taking new supplement named Saranya Sutherland MD: Dermal Repair Complex. Ingredients include:  Vitamin A   Thiamin   Riboflavin  Niacin (as niacinamide)  Vitamin B6 (as pyridoxine HCl)  Folate   Biotin   Pantothenic Acid   Dermal Repair Complex Blend Hydrolyzed Collagen, Saw Palmetto Fruit Powder, MSM (Methylsulfonylmethane), Wild Yam Root Extract, Hyaluronic Acid (as Sodium Hyaluronate), Silica 620 mg

## 2025-03-31 DIAGNOSIS — E03.9 HYPOTHYROIDISM, UNSPECIFIED TYPE: ICD-10-CM

## 2025-04-02 RX ORDER — LEVOTHYROXINE SODIUM 88 MCG
88 TABLET ORAL
Qty: 90 TABLET | Refills: 3 | Status: SHIPPED | OUTPATIENT
Start: 2025-04-02

## 2025-04-02 RX ORDER — LEVOTHYROXINE SODIUM 88 MCG
88 TABLET ORAL
Qty: 90 TABLET | Refills: 2 | OUTPATIENT
Start: 2025-04-02

## 2025-04-02 NOTE — TELEPHONE ENCOUNTER
Refill passed per Clinic protocol.  Requested Prescriptions   Pending Prescriptions Disp Refills    SYNTHROID 88 MCG Oral Tab [Pharmacy Med Name: Synthroid 88 Mcg Tab Abbv] 90 tablet 0     Sig: Take 1 tablet (88 mcg total) by mouth before breakfast.       Thyroid Medication Protocol Passed - 4/2/2025  2:47 PM        Passed - TSH in past 12 months        Passed - Last TSH value is normal     Lab Results   Component Value Date    TSH 2.598 12/06/2024                 Passed - In person appointment or virtual visit in the past 12 mos or appointment in next 3 mos     Recent Outpatient Visits              1 month ago Urinary symptom or sign    [facility], [address], Batsheva Spann APRN    Office Visit    3 months ago Routine general medical examination at a health care facility    [facility], [address] [place] [address] Karen Szymanski MD    Office Visit    4 months ago Acute non-recurrent maxillary sinusitis    [address] Karen Szymanski MD    Office Visit    7 months ago Well woman exam with routine gynecological exam    [facility] - OB[facility] Tiffany Pedersen MD    Office Visit    10 months ago Spondylolysis of lumbosacral region    [address] Eitan Sue MD    Office Visit          Future Appointments         Provider Department Appt Notes    In 4 months Tiffany Pedersen MD [facility] - OB/GYN wwe/ last phy Last 8/26/24                    Passed - Medication is active on med list

## 2025-06-09 ENCOUNTER — OFFICE VISIT (OUTPATIENT)
Dept: FAMILY MEDICINE CLINIC | Facility: CLINIC | Age: 60
End: 2025-06-09
Payer: COMMERCIAL

## 2025-06-09 ENCOUNTER — NURSE TRIAGE (OUTPATIENT)
Dept: FAMILY MEDICINE CLINIC | Facility: CLINIC | Age: 60
End: 2025-06-09

## 2025-06-09 VITALS
HEART RATE: 73 BPM | HEIGHT: 65 IN | SYSTOLIC BLOOD PRESSURE: 120 MMHG | BODY MASS INDEX: 21.58 KG/M2 | WEIGHT: 129.5 LBS | DIASTOLIC BLOOD PRESSURE: 68 MMHG | OXYGEN SATURATION: 99 % | TEMPERATURE: 98 F | RESPIRATION RATE: 16 BRPM

## 2025-06-09 DIAGNOSIS — R30.0 DYSURIA: ICD-10-CM

## 2025-06-09 DIAGNOSIS — N30.00 ACUTE CYSTITIS WITHOUT HEMATURIA: Primary | ICD-10-CM

## 2025-06-09 PROBLEM — H52.00 HYPERMETROPIA: Status: ACTIVE | Noted: 2025-06-09

## 2025-06-09 PROBLEM — H52.4 PRESBYOPIA: Status: ACTIVE | Noted: 2025-06-09

## 2025-06-09 LAB
BILIRUBIN: NEGATIVE
GLUCOSE (URINE DIPSTICK): NEGATIVE MG/DL
KETONES (URINE DIPSTICK): NEGATIVE MG/DL
MULTISTIX LOT#: ABNORMAL NUMERIC
NITRITE, URINE: POSITIVE
PH, URINE: 6 (ref 4.5–8)
PROTEIN (URINE DIPSTICK): 100 MG/DL
SPECIFIC GRAVITY: 1.01 (ref 1–1.03)
UROBILINOGEN,SEMI-QN: 0.2 MG/DL (ref 0–1.9)

## 2025-06-09 PROCEDURE — 87077 CULTURE AEROBIC IDENTIFY: CPT | Performed by: FAMILY MEDICINE

## 2025-06-09 PROCEDURE — 87186 SC STD MICRODIL/AGAR DIL: CPT | Performed by: FAMILY MEDICINE

## 2025-06-09 PROCEDURE — 87086 URINE CULTURE/COLONY COUNT: CPT | Performed by: FAMILY MEDICINE

## 2025-06-09 RX ORDER — CIPROFLOXACIN 500 MG/1
500 TABLET, FILM COATED ORAL 2 TIMES DAILY
Qty: 20 TABLET | Refills: 0 | Status: SHIPPED | OUTPATIENT
Start: 2025-06-09 | End: 2025-06-19

## 2025-06-09 RX ORDER — TRETINOIN 0.5 MG/G
CREAM TOPICAL
COMMUNITY
Start: 2025-02-28

## 2025-06-09 RX ORDER — PHENAZOPYRIDINE HYDROCHLORIDE 200 MG/1
200 TABLET, FILM COATED ORAL 3 TIMES DAILY PRN
Qty: 15 TABLET | Refills: 0 | Status: SHIPPED | OUTPATIENT
Start: 2025-06-09 | End: 2025-06-14

## 2025-06-09 NOTE — PATIENT INSTRUCTIONS
VISIT SUMMARY:    Today, you were seen for sudden urinary symptoms, including urgency, frequency, and blood in your urine. You also experienced a burning sensation and pain during urination. A recent urinalysis indicated a bacterial infection.    YOUR PLAN:    -URINARY TRACT INFECTION (UTI): A UTI is an infection in any part of your urinary system, most often caused by bacteria like E. coli. Your symptoms and urinalysis suggest a bacterial infection. We will send your urine for culture to identify the specific bacteria. You will take antibiotics twice a day for 10 days to treat the infection. For pain relief, you can take Pyridium three times a day as needed. Increase your fluid intake, including cranberry juice, to help flush out the bacteria. Additionally, it's important to urinate before and after intercourse to help prevent future infections.    INSTRUCTIONS:    Please follow up if your symptoms do not improve after completing the antibiotics or if they worsen. Make sure to complete the full course of antibiotics even if you start feeling better. Drink plenty of fluids and follow the prevention tips discussed.

## 2025-06-09 NOTE — TELEPHONE ENCOUNTER
Action Requested: Summary for Provider     []  Critical Lab, Recommendations Needed  [x] Need Additional Advice  []   FYI    []   Need Orders  [] Need Medications Sent to Pharmacy  []  Other     SUMMARY: Dr. Shook- Patient c/o urinary frequency. Seen in February at [facility] for UTI symptoms, does not want to return to [facility]. Would you add onto schedule today?     Reason for call: Urinary Frequency  Onset: Today   Reason for Disposition   Urinating more frequently than usual (i.e., frequency)    Protocols used: Urinary Symptoms-A-OH    C/o urinary frequency with small urine output  Seen in [facility] in February for UTI symptoms  Symptoms started today   Wants abx. Notified will need UA and to be seen prior to antibiotics being prescribed  Patient stated in February seen in [facility] as we didn't have any appointments at the time  She is free to come in today if Dr. Shook can add her on

## 2025-06-09 NOTE — PROGRESS NOTES
Family Medicine Progress Note  ASSESSMENT AND PLAN:  Emily Beltrán is a 59 year old female who is here for:     Emily was seen today for urinary frequency.    Diagnoses and all orders for this visit:    Acute cystitis without hematuria  Acute urinary symptoms with urinalysis indicating bacterial infection, likely E. coli. Postmenopausal status and recent intercourse may have contributed. Discussed UTI prevention and explained hematuria due to bladder inflammation.  - Send urine for culture.  - Prescribe antibiotics BID for 10 days.  - Prescribe Pyridium TID PRN for pain.  - Advise increased fluid intake, including cranberry juice.  - Educated on voiding before and after intercourse.  -     ciprofloxacin 500 MG Oral Tab; Take 1 tablet (500 mg total) by mouth 2 (two) times daily for 10 days.  -     Urine Culture, Routine [E]; Future  -     Urine Dip, auto without Micro  -     Urine Culture, Routine [E]    Dysuria  -     phenazopyridine (PYRIDIUM) 200 MG Oral Tab; Take 1 tablet (200 mg total) by mouth 3 (three) times daily as needed for Pain.       The patient indicates understanding of these issues and agrees to the plan.  Follow-Up: The patient is asked to return in Return if symptoms worsen or fail to improve.      Karen Shook MD        CC: Urinary Frequency    The following individual(s) verbally consented to be recorded using ambient AI listening technology and understand that they can each withdraw their consent to this listening technology at any point by asking the clinician to turn off or pause the recording:    Patient name: Emily Beltrán    HPI:     History of Present Illness  Emily Beltrán is a 59 year old female who presents with urinary symptoms and hematuria.    She experienced the sudden onset of urinary urgency and frequency this morning, with difficulty reaching the bathroom in time. Her urine appeared discolored and contained blood.    She describes a burning sensation and pain that began  today, which worsens during urination. She has a history of similar symptoms in the past, for which she was treated with medication, but she did not experience increased frequency at that time.  Was sexually active a couple of days ago.    ALLERGY:     Allergies as of 06/09/2025 - Review Complete 06/09/2025   Allergen Reaction Noted    Biaxin [clarithromycin] PAIN 06/06/2013    Morphine sulfate NAUSEA AND VOMITING 06/06/2013     MEDICATIONS:     Current Medications[1]   Past Medical History[2]   Social History:  Short Social Hx on File[3]     REVIEW OF SYSTEMS:   ROS as documented in HPI    EXAM:   /68   Pulse 73   Temp 97.9 °F (36.6 °C) (Temporal)   Resp 16   Ht 5' 5\" (1.651 m)   Wt 129 lb 8 oz (58.7 kg)   LMP  (LMP Unknown)   SpO2 99%   BMI 21.55 kg/m²   GENERAL: well developed, well nourished,in no apparent distress  NECK: supple  LUNGS: clear to auscultation  CARDIO: RRR without murmur  ABDOMEN: soft, mild suprapubic tenderness, no CVAT bilateral    Component      Latest Ref Rng 6/9/2025   Glucose Urine      Negative mg/dL Negative    Bilirubin Urine      Negative  Negative    Ketones, UA      Negative - Trace mg/dL Negative    Spec Gravity      1.005 - 1.030  1.015    Blood Urine      Negative  Large !    PH Urine      5.0 - 8.0  6.0    Protein Urine      Negative - Trace mg/dL 100 !    Urobilinogen Urine      0.2 - 1.0 mg/dL 0.2    Nitrite Urine      Negative  Positive !    Leukocyte Esterase Urine      Negative  Small !    APPEARANCE      Clear  cloudy    Color Urine      Yellow  dark red    Multistix Lot#      Numeric [id number]    Multistix Expiration Date      Date 10/30/25       Legend:  ! Abnormal  NOTE TO PATIENT: The 21st Century Cures Act makes clinical notes like these available to patients in the interest of transparency. Clinical notes are medical documents used by physicians and care providers to communicate with each other. These documents include medical language and terminology,  abbreviations, and treatment information that may sound technical and at times possibly unfamiliar. In addition, at times, the verbiage may appear blunt or direct. These documents are one tool providers use to communicate relevant information and clinical opinions of the care providers in a way that allows common understanding of the clinical context.      Karen Shook MD             [1]   Current Outpatient Medications   Medication Sig Dispense Refill    Tretinoin 0.05 % External Cream Apply a pea size amount to face at night 10 min after washing;moisturize after. Start at 3 nights a week then gradually increase to nightly.      ciprofloxacin 500 MG Oral Tab Take 1 tablet (500 mg total) by mouth 2 (two) times daily for 10 days. 20 tablet 0    SYNTHROID 88 MCG Oral Tab Take 1 tablet (88 mcg total) by mouth before breakfast. 90 tablet 3    sulfamethoxazole-trimethoprim DS (BACTRIM DS) 800-160 MG Oral Tab per tablet Take 1 tablet by mouth 2 (two) times daily. 14 tablet 0    Meloxicam 7.5 MG Oral Tab Take 1 tablet (7.5 mg total) by mouth daily. 30 tablet 0    ergocalciferol 1.25 MG (94269 UT) Oral Cap Take 1 capsule (50,000 Units total) by mouth once a week. 12 capsule 0   [2]   Past Medical History:   Acute maxillary sinusitis    Anemia    Arthritis    Little bit    Cancer (HCC)    Encounter for breast cancer screening other than mammogram    RIGHT BREAST ULTRASOUND FINDINGS: Subareolar ultrasound was performed. No solid or cystic mass is seen.    Food intolerance    Frequent use of laxatives    H/O mammogram    negative    H/O mammogram    Negative. Breasts are compsed of scattered areas of fibroglandular density.    H/O screening mammography    Negative exam   RECOMMENDATION: A 1 year screening mammogram is recommended.    History of endometrial biopsy    benign endometrial polyp    Hypothyroidism    Leukocytopenia, unspecified    Lung cancer (HCC)    Passes away age 82    Pap smear for cervical cancer  screening    negative    Uncomfortable fullness after meals    Unspecified hypothyroidism   [3]   Social History  Socioeconomic History    Marital status:    Tobacco Use    Smoking status: Never     Passive exposure: Never    Smokeless tobacco: Never   Vaping Use    Vaping status: Never Used   Substance and Sexual Activity    Alcohol use: Yes     Alcohol/week: 2.0 standard drinks of alcohol     Types: 2 Standard drinks or equivalent per week     Comment: Glass wine on weekends    Drug use: No    Sexual activity: Yes     Partners: Male   Other Topics Concern    Caffeine Concern No    Stress Concern No    Weight Concern No    Special Diet No    Exercise Yes    Seat Belt Yes     Social Drivers of Health      Received from [facility]    Housing Stability

## 2025-06-25 ENCOUNTER — TELEPHONE (OUTPATIENT)
Facility: CLINIC | Age: 60
End: 2025-06-25

## 2025-06-25 NOTE — TELEPHONE ENCOUNTER
Called pt and lm to let her know her appt on 08- was canceled because dr CONNOR is not available that day. Pt to call back the office to esthela Thanks

## 2025-08-29 ENCOUNTER — OFFICE VISIT (OUTPATIENT)
Facility: CLINIC | Age: 60
End: 2025-08-29

## 2025-08-29 VITALS
HEIGHT: 65 IN | SYSTOLIC BLOOD PRESSURE: 114 MMHG | HEART RATE: 61 BPM | BODY MASS INDEX: 21.29 KG/M2 | DIASTOLIC BLOOD PRESSURE: 60 MMHG | WEIGHT: 127.81 LBS

## 2025-08-29 DIAGNOSIS — Z01.419 WELL WOMAN EXAM WITH ROUTINE GYNECOLOGICAL EXAM: Primary | ICD-10-CM

## 2025-08-29 PROCEDURE — 99396 PREV VISIT EST AGE 40-64: CPT | Performed by: OBSTETRICS & GYNECOLOGY

## 2025-08-29 PROCEDURE — 3008F BODY MASS INDEX DOCD: CPT | Performed by: OBSTETRICS & GYNECOLOGY

## 2025-08-29 PROCEDURE — 3074F SYST BP LT 130 MM HG: CPT | Performed by: OBSTETRICS & GYNECOLOGY

## 2025-08-29 PROCEDURE — 3078F DIAST BP <80 MM HG: CPT | Performed by: OBSTETRICS & GYNECOLOGY

## (undated) DIAGNOSIS — E03.9 HYPOTHYROIDISM, UNSPECIFIED TYPE: ICD-10-CM

## (undated) NOTE — Clinical Note
I had the pleasure of seeing Carlyn Macedo on 2/7/2022. Please see my attached note.     Anna Bill MD FACS  [facility]

## (undated) NOTE — ED AVS SNAPSHOT
Ruth Rose   MRN: [id number]    Department:  [facility] Emergency Department   Date of Visit:  10/22/2019           Disclosure     Insurance plans vary and the physician(s) referred by the ER may not be covered by your plan.  Please contact you tell this physician (or your personal doctor if your instructions are to return to your personal doctor) about any new or lasting problems. The primary care or specialist physician will see patients referred from the [facility] Emergency Department.  Praveen Oswald

## (undated) NOTE — Clinical Note
I had the pleasure of seeing Yuridia Barreto on 7/13/2022. Please see my attached note.     Buzz Spaulding MD FACS  [facility]

## (undated) NOTE — MR AVS SNAPSHOT
After Visit Summary   8/2/2021    Darby Solano    MRN: [id number]           Visit Information     Date & Time  8/2/2021  9:00 AM Provider  Rhoda Wing MD [address] [facility]  Dept.  Phone  [address] [facility] someone to schedule your appointment, please call Kirstin Donohue Scheduling at [phone]. [facility] now offers Video Visits through [address] for adult and pediatric patients.   Video Visits are available Monday - Friday for many common Jenna Díaz   Monday – Friday  10:00 am – 10:00 pm   Saturday – Sunday  10:00 am – 4:00 pm     [address]   Monday – Friday  4:00 pm – 10:00 pm   Saturday – Sunday  10:00 am – 4:00 pm  [facility]  Emergency Medicine Providers  Conditions needin

## (undated) NOTE — LETTER
11/21/17        [facility] [id number] [phone]      Dear Gissell Prior records indicate that you have outstanding lab work and or testing that was ordered for you and has not yet been completed:          TSH [899]      Occult Blood,

## (undated) NOTE — Clinical Note
I had the pleasure of seeing Anna Scripture on 5/18/2022. Please see my attached note.     Jinger Libman, MD FACS  [facility]

## (undated) NOTE — Clinical Note
I had the pleasure of seeing Nadege Milka on 4/11/2022. Please see my attached note.     Terrance Campos MD FACS  [facility]

## (undated) NOTE — LETTER
07/13/18        [phone]      Dear Dipti Valenzuela records indicate that you have outstanding lab work and or testing that was ordered for you and has not yet been completed:          CBC [4199] [Q]      IRON AND

## (undated) NOTE — Clinical Note
I had the pleasure of seeing Carlyn Macedo on 12/14/2022. Please see my attached note.   Anna Bill MD FACS [facility]

## (undated) NOTE — Clinical Note
I had the pleasure of seeing Guillermo Romo on 10/5/2022. Please see my attached note.     Ky Ruiz MD FACS  [facility]

## (undated) NOTE — LETTER
11/12/20        [phone]      Dear Dipti Valenzuela records indicate that you have outstanding lab work and or testing that was ordered for you and has not yet been completed:  Orders Placed This Encounter      CB